# Patient Record
Sex: MALE
[De-identification: names, ages, dates, MRNs, and addresses within clinical notes are randomized per-mention and may not be internally consistent; named-entity substitution may affect disease eponyms.]

---

## 2021-01-01 ENCOUNTER — EXTERNAL RECORD (OUTPATIENT)
Dept: HEALTH INFORMATION MANAGEMENT | Facility: OTHER | Age: 61
End: 2021-01-01

## 2021-05-01 PROCEDURE — 99221 1ST HOSP IP/OBS SF/LOW 40: CPT | Performed by: SURGERY

## 2021-05-02 PROCEDURE — 99231 SBSQ HOSP IP/OBS SF/LOW 25: CPT | Performed by: SURGERY

## 2021-05-04 PROCEDURE — 99232 SBSQ HOSP IP/OBS MODERATE 35: CPT | Performed by: SURGERY

## 2021-05-10 PROCEDURE — 99221 1ST HOSP IP/OBS SF/LOW 40: CPT | Performed by: SURGERY

## 2021-05-11 PROCEDURE — 99233 SBSQ HOSP IP/OBS HIGH 50: CPT | Performed by: SURGERY

## 2021-05-12 PROCEDURE — 99233 SBSQ HOSP IP/OBS HIGH 50: CPT | Performed by: SURGERY

## 2021-05-13 PROCEDURE — 44160 REMOVAL OF COLON: CPT | Performed by: SURGERY

## 2021-05-13 PROCEDURE — 10061 I&D ABSCESS COMP/MULTIPLE: CPT | Performed by: SURGERY

## 2021-05-14 PROCEDURE — 99024 POSTOP FOLLOW-UP VISIT: CPT | Performed by: SURGERY

## 2021-05-15 PROCEDURE — 99024 POSTOP FOLLOW-UP VISIT: CPT | Performed by: SURGERY

## 2021-05-18 PROCEDURE — 44143 PARTIAL REMOVAL OF COLON: CPT | Performed by: SURGERY

## 2021-05-19 PROCEDURE — 99024 POSTOP FOLLOW-UP VISIT: CPT | Performed by: SURGERY

## 2021-05-21 PROCEDURE — 99024 POSTOP FOLLOW-UP VISIT: CPT | Performed by: SURGERY

## 2021-05-22 PROCEDURE — 99024 POSTOP FOLLOW-UP VISIT: CPT | Performed by: SURGERY

## 2021-06-11 ENCOUNTER — OFFICE VISIT (OUTPATIENT)
Dept: UROLOGY | Age: 61
End: 2021-06-11

## 2021-06-11 DIAGNOSIS — K63.1 PERFORATED SIGMOID COLON (CMD): ICD-10-CM

## 2021-06-11 DIAGNOSIS — Z90.49 S/P SMALL BOWEL RESECTION: ICD-10-CM

## 2021-06-11 DIAGNOSIS — K50.813 CROHN'S DISEASE OF BOTH SMALL AND LARGE INTESTINE WITH FISTULA (CMD): ICD-10-CM

## 2021-06-11 DIAGNOSIS — Z93.3 COLOSTOMY IN PLACE (CMD): Primary | ICD-10-CM

## 2021-06-11 DIAGNOSIS — Z90.49 S/P PARTIAL COLECTOMY: ICD-10-CM

## 2021-06-11 PROCEDURE — 99024 POSTOP FOLLOW-UP VISIT: CPT | Performed by: SURGERY

## 2021-06-21 PROBLEM — Z90.49 S/P PARTIAL COLECTOMY: Status: ACTIVE | Noted: 2021-06-21

## 2021-06-21 PROBLEM — Z93.3 COLOSTOMY IN PLACE (CMD): Status: ACTIVE | Noted: 2021-06-21

## 2021-06-21 PROBLEM — K50.813 CROHN'S DISEASE OF BOTH SMALL AND LARGE INTESTINE WITH FISTULA (CMD): Status: ACTIVE | Noted: 2021-06-21

## 2021-06-21 PROBLEM — Z90.49 S/P SMALL BOWEL RESECTION: Status: ACTIVE | Noted: 2021-06-21

## 2021-06-21 PROBLEM — K63.1 PERFORATED SIGMOID COLON (CMD): Status: ACTIVE | Noted: 2021-06-21

## 2021-06-21 ASSESSMENT — ENCOUNTER SYMPTOMS
HEMATOLOGIC/LYMPHATIC NEGATIVE: 1
ENDOCRINE NEGATIVE: 1
RESPIRATORY NEGATIVE: 1
CONSTITUTIONAL NEGATIVE: 1
PSYCHIATRIC NEGATIVE: 1
NEUROLOGICAL NEGATIVE: 1
EYES NEGATIVE: 1
ALLERGIC/IMMUNOLOGIC NEGATIVE: 1
GASTROINTESTINAL NEGATIVE: 1

## 2021-06-22 ENCOUNTER — CLINICAL ABSTRACT (OUTPATIENT)
Dept: HEALTH INFORMATION MANAGEMENT | Age: 61
End: 2021-06-22

## 2021-07-09 ENCOUNTER — OFFICE VISIT (OUTPATIENT)
Dept: SURGERY | Age: 61
End: 2021-07-09

## 2021-07-09 DIAGNOSIS — K63.1 PERFORATED SIGMOID COLON (CMD): ICD-10-CM

## 2021-07-09 DIAGNOSIS — Z90.49 S/P PARTIAL COLECTOMY: ICD-10-CM

## 2021-07-09 DIAGNOSIS — Z93.3 COLOSTOMY IN PLACE (CMD): Primary | ICD-10-CM

## 2021-07-09 DIAGNOSIS — Z90.49 S/P SMALL BOWEL RESECTION: ICD-10-CM

## 2021-07-09 DIAGNOSIS — K50.813 CROHN'S DISEASE OF BOTH SMALL AND LARGE INTESTINE WITH FISTULA (CMD): ICD-10-CM

## 2021-07-09 PROCEDURE — 99024 POSTOP FOLLOW-UP VISIT: CPT | Performed by: SURGERY

## 2021-07-09 RX ORDER — LOSARTAN POTASSIUM 25 MG/1
TABLET ORAL
COMMUNITY
Start: 2016-05-11

## 2021-07-09 RX ORDER — APIXABAN 5 MG/1
TABLET, FILM COATED ORAL
COMMUNITY
Start: 2021-04-08

## 2021-07-09 RX ORDER — NIACIN 500 MG
TABLET ORAL
COMMUNITY
Start: 2017-04-11

## 2021-07-26 ASSESSMENT — ENCOUNTER SYMPTOMS
CONSTITUTIONAL NEGATIVE: 1
RESPIRATORY NEGATIVE: 1
EYES NEGATIVE: 1
PSYCHIATRIC NEGATIVE: 1
NEUROLOGICAL NEGATIVE: 1
GASTROINTESTINAL NEGATIVE: 1
ENDOCRINE NEGATIVE: 1
HEMATOLOGIC/LYMPHATIC NEGATIVE: 1
ALLERGIC/IMMUNOLOGIC NEGATIVE: 1

## 2021-08-10 ENCOUNTER — OFFICE VISIT (OUTPATIENT)
Dept: SURGERY | Age: 61
End: 2021-08-10

## 2021-08-10 DIAGNOSIS — Z93.3 COLOSTOMY IN PLACE (CMD): Primary | ICD-10-CM

## 2021-08-10 DIAGNOSIS — Z90.49 S/P PARTIAL COLECTOMY: ICD-10-CM

## 2021-08-10 DIAGNOSIS — K63.1 PERFORATED SIGMOID COLON (CMD): ICD-10-CM

## 2021-08-10 DIAGNOSIS — K50.813 CROHN'S DISEASE OF BOTH SMALL AND LARGE INTESTINE WITH FISTULA (CMD): ICD-10-CM

## 2021-08-10 DIAGNOSIS — Z90.49 S/P SMALL BOWEL RESECTION: ICD-10-CM

## 2021-08-10 PROCEDURE — 99024 POSTOP FOLLOW-UP VISIT: CPT | Performed by: SURGERY

## 2021-08-16 ASSESSMENT — ENCOUNTER SYMPTOMS
EYES NEGATIVE: 1
CONSTITUTIONAL NEGATIVE: 1
NEUROLOGICAL NEGATIVE: 1
RESPIRATORY NEGATIVE: 1
HEMATOLOGIC/LYMPHATIC NEGATIVE: 1
ENDOCRINE NEGATIVE: 1
ALLERGIC/IMMUNOLOGIC NEGATIVE: 1
PSYCHIATRIC NEGATIVE: 1
GASTROINTESTINAL NEGATIVE: 1

## 2021-09-08 ENCOUNTER — TELEPHONE (OUTPATIENT)
Dept: SURGERY | Age: 61
End: 2021-09-08

## 2025-01-30 ENCOUNTER — HBO CONSULT (OUTPATIENT)
Dept: WOUND CARE | Facility: HOSPITAL | Age: 65
End: 2025-01-30
Attending: FAMILY MEDICINE
Payer: MEDICARE

## 2025-01-30 VITALS
RESPIRATION RATE: 16 BRPM | SYSTOLIC BLOOD PRESSURE: 134 MMHG | DIASTOLIC BLOOD PRESSURE: 78 MMHG | HEART RATE: 72 BPM | TEMPERATURE: 98 F

## 2025-01-30 VITALS
DIASTOLIC BLOOD PRESSURE: 87 MMHG | TEMPERATURE: 97 F | SYSTOLIC BLOOD PRESSURE: 134 MMHG | RESPIRATION RATE: 16 BRPM | HEART RATE: 106 BPM

## 2025-01-30 DIAGNOSIS — R30.0 DYSURIA: ICD-10-CM

## 2025-01-30 DIAGNOSIS — N30.40 RADIATION CYSTITIS: Primary | ICD-10-CM

## 2025-01-30 DIAGNOSIS — Z92.3 HISTORY OF THERAPEUTIC RADIATION: ICD-10-CM

## 2025-01-30 DIAGNOSIS — Z85.46 HISTORY OF PROSTATE CANCER: ICD-10-CM

## 2025-01-30 DIAGNOSIS — R31.0 GROSS HEMATURIA: ICD-10-CM

## 2025-01-30 PROCEDURE — 99205 OFFICE O/P NEW HI 60 MIN: CPT | Performed by: FAMILY MEDICINE

## 2025-01-30 RX ORDER — AMLODIPINE BESYLATE 5 MG/1
5 TABLET ORAL DAILY
COMMUNITY

## 2025-01-30 RX ORDER — TAMSULOSIN HYDROCHLORIDE 0.4 MG/1
0.4 CAPSULE ORAL DAILY
COMMUNITY

## 2025-01-30 NOTE — PROGRESS NOTES
.Weekly Wound Education Note    Teaching Provided To: Patient  Training Topics: HBO;Test/procedures  Training Method: Explain/Verbal;Written  Training Response: Patient responds and understands            Patient is here for an HBOT consult for radiation cystitis.  History of prostate surgery 2020, brachytherapy 2022.  Tech consult after this visit.  Patient would like to begin treatment after vacation Feb. 17th.

## 2025-01-30 NOTE — PROGRESS NOTES
Hyperbaric Therapy Evaluation      Robert Andrew is a 64 year old male.   REFERRING PHYSICIAN: Dr. Cuevas ref. provider found    HPI:  Patient here for HBOT evaluation.  Reason for HBOT episodes of pain with urination which is 10 out of 10.  This started over the past 2 months.  He had noticed an episode of bleeding as well.  This happened twice.  Patient has a history of having prostate cancer with seed radiation seed implants done 2 years ago.  He otherwise feels well and does have a past medical history significant for multiple blood clots in the lung after having DVT in the left calf and around 2020 or 2021.  He has no problems with that now and denies any chest pain shortness of breath.  He is on Eliquis since that time.  He does have a colostomy right lower abdomen.    Does the patient have a prior history of HBO treatments?  NO    Does the patient have a history of ear disease including prior tympanic membrane injury of pressure equalization tubes?  NO    Does the patient have a history of problems equalizing his ears during air travel?  NO    Does the patient have a history of claustrophobia?  YES    Does the patient have a history of uncorrected cataracts? NO    Does the patient have a history of pneumothorax?  NO    Does the patient have a history of seizures?  NO    Does the patient have a history of cardiomyopathy?  NO    Is the patient on Home Oxygen?  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 YES, 2  YEARS AGO, PROSTATE SEED IMPLANTS    Current Outpatient Medications   Medication Sig Dispense Refill    apixaban 5 MG Oral Tab Take 1 tablet (5 mg total) by mouth 2 (two) times daily.      amLODIPine 5 MG Oral Tab Take 1 tablet (5 mg total) by mouth daily.      tamsulosin 0.4 MG Oral Cap Take 1 capsule (0.4 mg total) by mouth daily.       Past Medical History:    Blood disorder    Cancer (HCC)    Crohn disease (HCC)    Deep vein thrombosis (HCC)     High blood pressure    Pulmonary embolism (HCC)     Past Surgical History:   Procedure Laterality Date    Bowel resection      Chemotherapy      Colonoscopy through stoma; with transendoscopic stent placement (includes predilation)      Us tenotomy achilles tendon (cpt=27605/31993)       Family History   Problem Relation Age of Onset    Diabetes Father     Cancer Mother      Social History     Socioeconomic History    Marital status:    Tobacco Use    Smoking status: Former     Types: Cigars    Smokeless tobacco: Never   Substance and Sexual Activity    Alcohol use: Never    Drug use: Never       REVIEW OF SYSTEMS:  GENERAL: feels well otherwise  SKIN: denies any unusual skin lesions  EYES: denies blurred vision or double vision  HEENT: denies nasal congestion  RESPIRATORY:denies wheezing  CARDIOVASCULAR: denies chest pain  GI: denies abdominal pain, denies heartburn  : denies hematuria  MUSCULOSKELETAL: denies chronic back pain  NEURO: denies chronic headaches  PSYCHE: denies uncontrolled depression or anxiety  HEMATOLOGIC: denies hx of VTE  ENDOCRINE: denies thyroid history      EXAM:  /78   Pulse 72   Temp 97.9 °F (36.6 °C)   Resp 16   GENERAL: well developed, well nourished, in no apparent distress  HEENT: atraumatic, normocephalic, throat  clear  EYES: conjunctiva are clear  NECK: supple, no adenopathy, no bruits  CHEST: no chest tenderness  LUNGS:clear to auscultation  CARDIO: S1S2  without murmur  GI: no tenderness  MUSCULOSKELETAL: back is not tender  EXTREMITIES: no cyanosis, clubbing or edema  NEURO: Oriented times three, motor and sensory are grossly intact    ASSESSMENT AND PLAN:    Robert was seen today for hyperbaric consult.    Diagnoses and all orders for this visit:    Radiation cystitis    History of therapeutic radiation    History of prostate cancer    Dysuria    Gross hematuria        Pretreatment evaluation for HBOT to treat RADIATION CYSTITIS WITH DYSURIA AND HEMATURIA.  At  this time no absolute contraindication to HBOT is present.  The nature, alternatives and benefits of HBOT have been explained to the patient.  The risk of HBOT including but not limited to barotrauma, oxygen toxicity, visual acuity changes, worsening of established cataracts, tingling of fingers, seizures and rare possibility of fire have been explained to the patient who expressed understanding.  Plan is to proceed with HBOT.    I recommend 30 treatments to start    2.5 JULEE with 10 minute air break    2.0 psi/minute    2.0 psi/minute  Spent a total of 60 minutes obtaining history evaluating patient, reviewing medical chart, discussing treatment options and completing documentation.

## 2025-01-30 NOTE — PROGRESS NOTES
Pt. Received hyperbaric safety and technical consultation along with hyperbaric patient education.   Pt. Indicated understanding.

## 2025-02-10 NOTE — PROGRESS NOTES
Patient requesting antianxiety medicine to do hyperbaric oxygen treatments.    Will prescribe Xanax 0.5 mg take 1 or 2 tablets 30 minutes before hyperbaric oxygen treatment    Risk and benefit discussed including habit-forming nature.

## 2025-02-17 ENCOUNTER — HBO THERAPY VISIT (OUTPATIENT)
Dept: WOUND CARE | Facility: HOSPITAL | Age: 65
End: 2025-02-17
Attending: FAMILY MEDICINE
Payer: MEDICARE

## 2025-02-17 DIAGNOSIS — Z92.3 HISTORY OF THERAPEUTIC RADIATION: ICD-10-CM

## 2025-02-17 DIAGNOSIS — N30.40 RADIATION CYSTITIS: Primary | ICD-10-CM

## 2025-02-17 DIAGNOSIS — Z85.46 HISTORY OF PROSTATE CANCER: ICD-10-CM

## 2025-02-17 DIAGNOSIS — R31.0 GROSS HEMATURIA: ICD-10-CM

## 2025-02-17 LAB — GLUCOSE BLD-MCNC: 111 MG/DL (ref 70–99)

## 2025-02-17 PROCEDURE — 82962 GLUCOSE BLOOD TEST: CPT | Performed by: INTERNAL MEDICINE

## 2025-02-17 NOTE — PROGRESS NOTES
Hyperbaric Daily Physician Note       Patient is here for Hyperbaric Oxygen therapy.   No complaints today.   Tolerating HBO with no side effects.     Blood Sugar:  Within normal limits.    Vitals:      1/30/2025     1:40 PM 1/30/2025     3:06 PM   Vitals History   /78 134/87   Pulse 72 106   Resp 16 16   Temp 97.9 °F (36.6 °C) 97.3 °F (36.3 °C)        DIAGNOSIS:       ICD-10-CM    1. Radiation cystitis  N30.40       2. Gross hematuria  R31.0       3. History of prostate cancer  Z85.46       4. History of therapeutic radiation  Z92.3           Pre/Post Treatment Evaluation      Pre-Treatment Lung Evaluation: Clear to auscultation bilaterally    Left Ear Pre-Treatment Evaluation  Left Ear Clear: Yes  Left Ear Intact: Yes  Left Cerumen Removal: No  Pre Left teed stgstrstastdstest:st st1st grade    Right Ear Pre-Treatment Evaluation  Right Ear Clear: Yes  Right Ear Intact: Yes  Right Cerumen Removal: No  Pre Right teed stgstrstastdstest:st st1st grade      Post-Treatment Lung Evaluation: Clear to auscultation bilaterally    Left Ear Post-Treatment Evaluation  Left Ear Clear: Yes  Left Ear Intact: Yes  Left Cerumen Removal: No  Post Left teed stgstrstastdstest:st st1st grade    Right Ear Post-Treatment Evaluation  Right Ear Clear: Yes  Right Ear Intact: Yes  Right Cerumen Removal: No  Post Right teed stgstrstastdstest:st st1st grade      I supervised this Hyperbaric treatment and was immediately available throughout the course of the treatment.  There is a trained emergency support team and ICU available at this facility to assist with complications.         Kwabena Harvey MD  2/17/2025  4:48 PM

## 2025-02-18 ENCOUNTER — HBO THERAPY VISIT (OUTPATIENT)
Dept: WOUND CARE | Facility: HOSPITAL | Age: 65
End: 2025-02-18
Attending: FAMILY MEDICINE
Payer: MEDICARE

## 2025-02-18 VITALS
DIASTOLIC BLOOD PRESSURE: 96 MMHG | RESPIRATION RATE: 12 BRPM | HEART RATE: 71 BPM | TEMPERATURE: 98 F | SYSTOLIC BLOOD PRESSURE: 147 MMHG

## 2025-02-18 DIAGNOSIS — R30.0 DYSURIA: ICD-10-CM

## 2025-02-18 DIAGNOSIS — N30.40 RADIATION CYSTITIS: Primary | ICD-10-CM

## 2025-02-18 DIAGNOSIS — Z85.46 HISTORY OF PROSTATE CANCER: ICD-10-CM

## 2025-02-18 DIAGNOSIS — R31.0 GROSS HEMATURIA: ICD-10-CM

## 2025-02-18 DIAGNOSIS — Z92.3 HISTORY OF THERAPEUTIC RADIATION: ICD-10-CM

## 2025-02-18 PROCEDURE — 99183 HYPERBARIC OXYGEN THERAPY: CPT | Performed by: FAMILY MEDICINE

## 2025-02-18 NOTE — PROGRESS NOTES
HBO Treatment Details      Patient Name:    Robert Andrew  MRN:  [id number]        YOB: 1960  Today's Date:  2025  Physician/Provider: Kwabena Harvey MD  Facility: [facility]  Diagnosis:   1. Radiation cystitis    2. Gross hematuria    3. History of prostate cancer    4. History of therapeutic radiation            Treatment Course Number: 1  Total Treatments Ordered:  30  Ordering Physician:   Indication: Soft tissue radionecrosis  Risk Factors: Seizure;Barotrauma  HBO Treatment Start Date:   HBO Treatment Number:  1  Treatment Length:120 Minutes  Treatment Depth: 2.5 JULEE  HBO Treatment End Date:   HBO Discharge Outcome: Treatment Complete        HBO Treatment Details:  In-Patient Visit: no  Chamber Type:  Hard sided monoplace  Chamber #: 2  HBO Dive Lo  Treatment Protocol:  2.5 JULEE with 10 min air break      Treatment Details:  Pressurized Rate: 2.0 psi/min  Dive Rate Down:  2.0 psi/min  Dive Rate Up:  2.0 psi/min  Started Compression: 1507  Reached Compression: 1517  Patient on Air: 1605  Patient Taken off Air:1615  Total Compression Time: 10 (Minutes)  Total Holding Time: 103 (Minutes)  Started Decompression: 1700  Reached Surface: 1517  Total Decompression Time: 10 (Minutes)  Total Airbreaks: 10 (Minutes)  Total Time of Treatment: 113 (Minutes)                         Vital Signs:  HBO Glucose: Pre 110 post 104 - (Reference Range: 100-350 mg/dl)        Vitals:    25 1701 25 1716   BP: 152/83 (!) 147/96   Pulse: 73 71   Resp: 12 12   Temp: 98.2 °F (36.8 °C) 97.7 °F (36.5 °C)       Treatment Response:  Symptoms Noted During Treatment: None   Treatment Aborted:  No      Treatment Notes:

## 2025-02-19 ENCOUNTER — HBO THERAPY VISIT (OUTPATIENT)
Dept: WOUND CARE | Facility: HOSPITAL | Age: 65
End: 2025-02-19
Attending: FAMILY MEDICINE
Payer: MEDICARE

## 2025-02-19 VITALS
DIASTOLIC BLOOD PRESSURE: 87 MMHG | HEART RATE: 68 BPM | SYSTOLIC BLOOD PRESSURE: 157 MMHG | RESPIRATION RATE: 14 BRPM | TEMPERATURE: 98 F

## 2025-02-19 DIAGNOSIS — Z92.3 HISTORY OF THERAPEUTIC RADIATION: ICD-10-CM

## 2025-02-19 DIAGNOSIS — Z85.46 HISTORY OF PROSTATE CANCER: ICD-10-CM

## 2025-02-19 DIAGNOSIS — R30.0 DYSURIA: ICD-10-CM

## 2025-02-19 DIAGNOSIS — N30.40 RADIATION CYSTITIS: Primary | ICD-10-CM

## 2025-02-19 PROCEDURE — 99183 HYPERBARIC OXYGEN THERAPY: CPT | Performed by: NURSE PRACTITIONER

## 2025-02-19 NOTE — PROGRESS NOTES
HBO Treatment Details      Patient Name:    Robert Andrew  MRN:  [id number]        YOB: 1960  Today's Date:  2/19/2025  Physician/Provider: Naresh Tuttle MD  Facility: [facility]  Diagnosis:   1. Radiation cystitis    2. Gross hematuria    3. History of prostate cancer    4. History of therapeutic radiation    5. Dysuria            Treatment Course Number: 2  Total Treatments Ordered:  30  Ordering Physician:   Indication: Soft tissue radionecrosis  Risk Factors: Seizure;Barotrauma  HBO Treatment Start Date:   HBO Treatment Number:  2  Treatment Length:120 Minutes  Treatment Depth: 2.5 JULEE  HBO Treatment End Date:   HBO Discharge Outcome: Treatment Complete        HBO Treatment Details:  In-Patient Visit: no  Chamber Type:  Hard sided monoplace  Chamber #: 2  HBO Dive Log:    Treatment Protocol:  2.5 JULEE with 10 min air break      Treatment Details:  Pressurized Rate: 2.0 psi/min  Dive Rate Down:  20 psi/min  Dive Rate Up:  2.0 psi/min  Started Compression: 1511  Reached Compression: 1521  Patient on Air: 1606  Patient Taken off Air:1616  Total Compression Time: 10 (Minutes)  Total Holding Time: 100 (Minutes)  Started Decompression: 1701  Reached Surface: 1521  Total Decompression Time: 10 (Minutes)  Total Airbreaks: 10 (Minutes)  Total Time of Treatment: 110 (Minutes)                         Vital Signs:  HBO Glucose:  - (Reference Range: 100-350 mg/dl)        Vitals:    02/18/25 1505 02/18/25 1715   BP: 155/84 157/87   Pulse: 68 68   Resp: 12 14   Temp: 97.7 °F (36.5 °C) 97.8 °F (36.6 °C)       Treatment Response:  Symptoms Noted During Treatment: None   Treatment Aborted:  No      Treatment Notes:  No

## 2025-02-19 NOTE — PROGRESS NOTES
Hyperbaric Daily Physician Note       Patient is here for Hyperbaric Oxygen therapy.     VITALS:      2/18/2025     3:05 PM 2/18/2025     5:15 PM   Vitals History   /84 157/87   Pulse 68 68   Resp 12 14   Temp 97.7 °F (36.5 °C) 97.8 °F (36.6 °C)         Vitals:  See HBO tech note/flowsheets for vitals pre/post    DIAGNOSIS:   1. Radiation cystitis    2. Dysuria    3. History of prostate cancer    4. History of therapeutic radiation        Pre/Post Treatment Evaluation      Pre-Treatment Lung Evaluation: Clear to auscultation bilaterally    Left Ear Pre-Treatment Evaluation  Left Ear Clear: Yes  Left Ear Intact: Yes  Left Cerumen Removal: No  Pre Left teed stgstrstastdstest:st st1st grade    Right Ear Pre-Treatment Evaluation  Right Ear Clear: Yes  Right Ear Intact: Yes  Right Cerumen Removal: No  Pre Right teed stgstrstastdstest:st st1st grade      Post-Treatment Lung Evaluation: Clear to auscultation bilaterally    Left Ear Post-Treatment Evaluation  Left Ear Clear: Yes  Left Ear Intact: Yes  Left Cerumen Removal: No  Post Left teed stgstrstastdstest:st st1st grade    Right Ear Post-Treatment Evaluation  Right Ear Clear: Yes  Right Ear Intact: Yes  Right Cerumen Removal: No  Post Right teed stgstrstastdstest:st st1st grade      I supervised this Hyperbaric treatment and was immediately available throughout the course of the treatment with a hospital approved hyperbaric physician available by phone.  In addition, there is a trained emergency support team and ICU available at this facility to assist with complications.         Madiha Lopez, BRENNON  2/19/2025  5:18 PM

## 2025-02-20 ENCOUNTER — HBO THERAPY VISIT (OUTPATIENT)
Dept: WOUND CARE | Facility: HOSPITAL | Age: 65
End: 2025-02-20
Attending: FAMILY MEDICINE
Payer: MEDICARE

## 2025-02-20 VITALS
RESPIRATION RATE: 12 BRPM | SYSTOLIC BLOOD PRESSURE: 144 MMHG | HEART RATE: 68 BPM | DIASTOLIC BLOOD PRESSURE: 84 MMHG | TEMPERATURE: 98 F

## 2025-02-20 VITALS
SYSTOLIC BLOOD PRESSURE: 138 MMHG | TEMPERATURE: 97 F | DIASTOLIC BLOOD PRESSURE: 86 MMHG | HEART RATE: 72 BPM | RESPIRATION RATE: 12 BRPM

## 2025-02-20 PROCEDURE — 99183 HYPERBARIC OXYGEN THERAPY: CPT | Performed by: FAMILY MEDICINE

## 2025-02-20 NOTE — PROGRESS NOTES
HBO Treatment Details      Patient Name:    Robert Andrew  MRN:  [id number]        YOB: 1960  Today's Date:  2025  Physician/Provider: BRENNON Puckett  Facility: [facility]  Diagnosis:   1. Radiation cystitis    2. Dysuria    3. History of prostate cancer    4. History of therapeutic radiation            Treatment Course Number: 3  Total Treatments Ordered:  30  Ordering Physician:   Indication:    Risk Factors:    HBO Treatment Start Date:   HBO Treatment Number:  3  Treatment Length:   Treatment Depth:    HBO Treatment End Date:   HBO Discharge Outcome: Treatment Complete        HBO Treatment Details:  In-Patient Visit: no  Chamber Type:  Hard sided monoplace  Chamber #:    HBO Dive Lo  Treatment Protocol:  2.5 JULEE with 10 min air break      Treatment Details:  Pressurized Rate:    Dive Rate Down:  2.0 psi/min  Dive Rate Up:  2.0 psi/min  Started Compression:    Reached Compression:    Patient on Air:    Patient Taken off Air:   Total Compression Time:   (Minutes)  Total Holding Time:   (Minutes)  Started Decompression:    Reached Surface:    Total Decompression Time:   (Minutes)  Total Airbreaks:   (Minutes)  Total Time of Treatment:   (Minutes)                         Vital Signs:  HBO Glucose:  - (Reference Range: 100-350 mg/dl)        Vitals:    25 1453 25 1703   BP: 124/80 138/86   Pulse: 77 72   Resp: 12 12   Temp: 97.2 °F (36.2 °C) 97.4 °F (36.3 °C)       Treatment Response:  Symptoms Noted During Treatment:     Treatment Aborted:  No      Treatment Notes:

## 2025-02-20 NOTE — PROGRESS NOTES
HBO Treatment Details      Patient Name:    Robert Andrew  MRN:  [id number]        YOB: 1960  Today's Date:  2025  Physician/Provider: Naresh Tuttle MD  Facility: [facility]  Diagnosis: No diagnosis found.        Treatment Course Number: 4  Total Treatments Ordered:  30  Ordering Physician:   Indication: Soft tissue radionecrosis  Risk Factors: Seizure  HBO Treatment Start Date:   HBO Treatment Number:  4  Treatment Length:120 Minutes  Treatment Depth: 2.5 JULEE  HBO Treatment End Date:   HBO Discharge Outcome: Treatment Complete        HBO Treatment Details:  In-Patient Visit: no  Chamber Type:  Hard sided monoplace  Chamber #: 2  HBO Dive Lo  Treatment Protocol:  2.5 JULEE with 10 min air break      Treatment Details:  Pressurized Rate: 2.0 psi/min  Dive Rate Down:  2.0 psi/min  Dive Rate Up:  2.0 psi/min  Started Compression: 1443  Reached Compression: 1453  Patient on Air: 1538  Patient Taken off Air:1548  Total Compression Time: 10 (Minutes)  Total Holding Time: 100 (Minutes)  Started Decompression: 1633  Reached Surface: 1453  Total Decompression Time: 10 (Minutes)  Total Airbreaks: 10 (Minutes)  Total Time of Treatment: 110 (Minutes)                         Vital Signs:  HBO Glucose:  - (Reference Range: 100-350 mg/dl)        Vitals:    25 1437 25 1712   BP: 132/82 144/84   Pulse: 68 68   Resp: 12 12   Temp: 97.6 °F (36.4 °C) 97.5 °F (36.4 °C)       Treatment Response:  Symptoms Noted During Treatment: None   Treatment Aborted:  No      Treatment Notes:

## 2025-02-21 ENCOUNTER — HBO THERAPY VISIT (OUTPATIENT)
Dept: WOUND CARE | Facility: HOSPITAL | Age: 65
End: 2025-02-21
Attending: FAMILY MEDICINE
Payer: MEDICARE

## 2025-02-21 VITALS
SYSTOLIC BLOOD PRESSURE: 116 MMHG | TEMPERATURE: 97 F | RESPIRATION RATE: 12 BRPM | HEART RATE: 64 BPM | DIASTOLIC BLOOD PRESSURE: 78 MMHG

## 2025-02-21 DIAGNOSIS — N30.40 RADIATION CYSTITIS: Primary | ICD-10-CM

## 2025-02-21 DIAGNOSIS — R30.0 DYSURIA: ICD-10-CM

## 2025-02-21 DIAGNOSIS — Z85.46 HISTORY OF PROSTATE CANCER: ICD-10-CM

## 2025-02-21 PROCEDURE — 99183 HYPERBARIC OXYGEN THERAPY: CPT | Performed by: NURSE PRACTITIONER

## 2025-02-21 NOTE — PROGRESS NOTES
Hyperbaric Daily Physician Note       Patient is here for Hyperbaric Oxygen therapy.     VITALS:      2/20/2025     5:12 PM 2/21/2025     2:45 PM   Vitals History   /84 142/84   Pulse 68 80   Resp 12 14   Temp 97.5 °F (36.4 °C) 97.8 °F (36.6 °C)         Vitals:  See HBO tech note/flowsheets for vitals pre/post    DIAGNOSIS:   1. Radiation cystitis    2. Dysuria    3. History of prostate cancer        Pre/Post Treatment Evaluation      Pre-Treatment Lung Evaluation: Clear to auscultation bilaterally    Left Ear Pre-Treatment Evaluation  Left Ear Clear: Yes  Left Ear Intact: Yes  Left Cerumen Removal: No  Pre Left teed stgstrstastdstest:st st1st grade    Right Ear Pre-Treatment Evaluation  Right Ear Clear: Yes  Right Ear Intact: Yes  Right Cerumen Removal: No  Pre Right teed stgstrstastdstest:st st1st grade      Post-Treatment Lung Evaluation: Clear to auscultation bilaterally    Left Ear Post-Treatment Evaluation  Left Ear Clear: Yes  Left Ear Intact: Yes  Left Cerumen Removal: No  Post Left teed stgstrstastdstest:st st1st grade    Right Ear Post-Treatment Evaluation  Right Ear Clear: Yes  Right Ear Intact: Yes  Right Cerumen Removal: No  Post Right teed stgstrstastdstest:st st1st grade      I supervised this Hyperbaric treatment and was immediately available throughout the course of the treatment with a hospital approved hyperbaric physician available by phone.  In addition, there is a trained emergency support team and ICU available at this facility to assist with complications.         Madiha Lopez, BRENNON  2/21/2025  5:03 PM

## 2025-02-21 NOTE — PROGRESS NOTES
HBO Treatment Details      Patient Name:    Robert Andrew  MRN:  [id number]        YOB: 1960  Today's Date:  2025  Physician/Provider: BRENNON Puckett  Facility: [facility]  Diagnosis:   1. Radiation cystitis    2. Dysuria    3. History of prostate cancer            Treatment Course Number: 5  Total Treatments Ordered:  30    Indication: STRN  Risk Factors:  Barotrauma, Seizure    HBO Treatment Number:  5    HBO Discharge Outcome: No Change        HBO Treatment Details:  In-Patient Visit: no  Chamber Type:  hard sided monoplace  Chamber #: 2  HBO Dive Lo  Treatment Protocol:  2.5 JULEE with 10 minute air break      Treatment Details:  Pressurized Rate:    Dive Rate Down:  2.0 psi  Dive Rate Up:  2.0 psi  Started Compression:  1458  Reached Compression:  1508  Patient on Air:  1553  Patient Taken off Air: 1603    Started Decompression:  1648  Reached Surface:  1658  Total Time of Treatment:  120 (Minutes)                         Vital Signs:       Vitals:    25 1445 25 1720   BP: 142/84 116/78   Pulse: 80 64   Resp: 14 12   Temp: 97.8 °F (36.6 °C) 97.2 °F (36.2 °C)       Treatment Response:  Symptoms Noted During Treatment:     Treatment Aborted:  no

## 2025-02-24 ENCOUNTER — HBO THERAPY VISIT (OUTPATIENT)
Dept: WOUND CARE | Facility: HOSPITAL | Age: 65
End: 2025-02-24
Attending: FAMILY MEDICINE
Payer: MEDICARE

## 2025-02-24 DIAGNOSIS — N30.40 RADIATION CYSTITIS: Primary | ICD-10-CM

## 2025-02-24 DIAGNOSIS — R30.0 DYSURIA: ICD-10-CM

## 2025-02-24 DIAGNOSIS — Z92.3 HISTORY OF THERAPEUTIC RADIATION: ICD-10-CM

## 2025-02-24 DIAGNOSIS — Z85.46 HISTORY OF PROSTATE CANCER: ICD-10-CM

## 2025-02-24 NOTE — PROGRESS NOTES
Hyperbaric Daily Physician Note       Patient is here for Hyperbaric Oxygen therapy.   No complaints today.   Tolerating HBO with no side effects.     Blood Sugar:  Within normal limits.    Vitals:      2/21/2025     2:45 PM 2/21/2025     5:20 PM   Vitals History   /84 116/78   Pulse 80 64   Resp 14 12   Temp 97.8 °F (36.6 °C) 97.2 °F (36.2 °C)        DIAGNOSIS:       ICD-10-CM    1. Radiation cystitis  N30.40       2. Dysuria  R30.0       3. History of prostate cancer  Z85.46       4. History of therapeutic radiation  Z92.3           Pre/Post Treatment Evaluation      Pre-Treatment Lung Evaluation: Clear to auscultation bilaterally    Left Ear Pre-Treatment Evaluation  Left Ear Clear: Yes  Left Ear Intact: Yes  Left Cerumen Removal: No  Pre Left teed stgstrstastdstest:st st1st grade    Right Ear Pre-Treatment Evaluation  Right Ear Clear: Yes  Right Ear Intact: Yes  Right Cerumen Removal: No  Pre Right teed stgstrstastdstest:st st1st grade      Post-Treatment Lung Evaluation: Clear to auscultation bilaterally    Left Ear Post-Treatment Evaluation  Left Ear Clear: Yes  Left Ear Intact: Yes  Left Cerumen Removal: No  Post Left teed stgstrstastdstest:st st1st grade    Right Ear Post-Treatment Evaluation  Right Ear Clear: Yes  Right Ear Intact: Yes  Right Cerumen Removal: No  Post Right teed stgstrstastdstest:st st1st grade      I supervised this Hyperbaric treatment and was immediately available throughout the course of the treatment.  There is a trained emergency support team and ICU available at this facility to assist with complications.         Kwabena Harvey MD  2/24/2025  5:06 PM

## 2025-02-25 ENCOUNTER — HBO THERAPY VISIT (OUTPATIENT)
Dept: WOUND CARE | Facility: HOSPITAL | Age: 65
End: 2025-02-25
Attending: FAMILY MEDICINE
Payer: MEDICARE

## 2025-02-25 VITALS
DIASTOLIC BLOOD PRESSURE: 80 MMHG | RESPIRATION RATE: 16 BRPM | HEART RATE: 62 BPM | TEMPERATURE: 97 F | SYSTOLIC BLOOD PRESSURE: 128 MMHG

## 2025-02-25 DIAGNOSIS — N30.40 RADIATION CYSTITIS: Primary | ICD-10-CM

## 2025-02-25 PROCEDURE — 99183 HYPERBARIC OXYGEN THERAPY: CPT | Performed by: FAMILY MEDICINE

## 2025-02-25 NOTE — PROGRESS NOTES
HBO Treatment Details      Patient Name:    Robert Andrew  MRN:  [id number]        YOB: 1960  Today's Date:  2025  Physician/Provider: Naresh Tuttle MD  Facility: [facility]  Diagnosis: No diagnosis found.        Treatment Course Number: 7  Total Treatments Ordered:  30  Ordering Physician:   Indication: Soft tissue radionecrosis  Risk Factors: Barotrauma;Seizure  HBO Treatment Start Date:   HBO Treatment Number:  7  Treatment Length:120 Minutes  Treatment Depth: 2.5 JULEE  HBO Treatment End Date:   HBO Discharge Outcome: Treatment Complete        HBO Treatment Details:  In-Patient Visit: no  Chamber Type:  Monoplace  Chamber #: 2  HBO Dive Lo  Treatment Protocol:  2.5 JULEE with 10 minute air break      Treatment Details:  Pressurized Rate: 2.0 psi/min  Dive Rate Down:  2.0 psi/min   Dive Rate Up:  2.0 psi/min  Started Compression: 1447  Reached Compression: 1458  Patient on Air: 1543  Patient Taken off Air:1553  Total Compression Time: 11 (Minutes)  Total Holding Time: 100 (Minutes)  Started Decompression: 1638  Reached Surface: 1458  Total Decompression Time: 10 (Minutes)  Total Airbreaks: 10 (Minutes)  Total Time of Treatment: 111 (Minutes)                         Vital Signs:  HBO Glucose: n/a - (Reference Range: 100-350 mg/dl)        Vitals:    25 1442 25 1653   BP: 122/78 128/80   Pulse: 68 62   Resp: 12 16   Temp: 97.9 °F (36.6 °C) 97.4 °F (36.3 °C)       Treatment Response:  Symptoms Noted During Treatment: None   Treatment Aborted:  no      Treatment Notes:  Patient is here for a wound care follow up.

## 2025-02-26 ENCOUNTER — HBO THERAPY VISIT (OUTPATIENT)
Dept: WOUND CARE | Facility: HOSPITAL | Age: 65
End: 2025-02-26
Attending: FAMILY MEDICINE
Payer: MEDICARE

## 2025-02-26 DIAGNOSIS — Z92.3 HISTORY OF THERAPEUTIC RADIATION: ICD-10-CM

## 2025-02-26 DIAGNOSIS — N30.40 RADIATION CYSTITIS: Primary | ICD-10-CM

## 2025-02-26 DIAGNOSIS — Z85.46 HISTORY OF PROSTATE CANCER: ICD-10-CM

## 2025-02-26 NOTE — PROGRESS NOTES
Hyperbaric Daily Physician Note       Patient is here for Hyperbaric Oxygen therapy.   No complaints today.   Tolerating HBO with no side effects.     Blood Sugar:  Within normal limits.    Vitals:      2/25/2025     2:42 PM 2/25/2025     4:53 PM   Vitals History   /78 128/80   Pulse 68 62   Resp 12 16   Temp 97.9 °F (36.6 °C) 97.4 °F (36.3 °C)        DIAGNOSIS:       ICD-10-CM    1. Radiation cystitis  N30.40       2. History of prostate cancer  Z85.46       3. History of therapeutic radiation  Z92.3           Pre/Post Treatment Evaluation      Pre-Treatment Lung Evaluation: Clear to auscultation bilaterally    Left Ear Pre-Treatment Evaluation  Left Ear Clear: Yes  Left Ear Intact: Yes  Left Cerumen Removal: No  Pre Left teed stgstrstastdstest:st st1st grade    Right Ear Pre-Treatment Evaluation  Right Ear Clear: Yes  Right Ear Intact: Yes  Right Cerumen Removal: No  Pre Right teed stgstrstastdstest:st st1st grade      Post-Treatment Lung Evaluation: Clear to auscultation bilaterally    Left Ear Post-Treatment Evaluation  Left Ear Clear: Yes  Left Ear Intact: Yes  Left Cerumen Removal: No  Post Left teed stgstrstastdstest:st st1st grade    Right Ear Post-Treatment Evaluation  Right Ear Clear: Yes  Right Ear Intact: Yes  Right Cerumen Removal: No  Post Right teed stgstrstastdstest:st st1st grade      I supervised this Hyperbaric treatment and was immediately available throughout the course of the treatment.  There is a trained emergency support team and ICU available at this facility to assist with complications.         Kwabena Harvey MD  2/26/2025  5:18 PM

## 2025-02-27 ENCOUNTER — HBO THERAPY VISIT (OUTPATIENT)
Dept: WOUND CARE | Facility: HOSPITAL | Age: 65
End: 2025-02-27
Attending: FAMILY MEDICINE
Payer: MEDICARE

## 2025-02-27 VITALS
TEMPERATURE: 98 F | HEART RATE: 80 BPM | RESPIRATION RATE: 14 BRPM | SYSTOLIC BLOOD PRESSURE: 136 MMHG | DIASTOLIC BLOOD PRESSURE: 88 MMHG

## 2025-02-27 VITALS
HEART RATE: 76 BPM | SYSTOLIC BLOOD PRESSURE: 122 MMHG | RESPIRATION RATE: 12 BRPM | TEMPERATURE: 98 F | DIASTOLIC BLOOD PRESSURE: 86 MMHG

## 2025-02-27 DIAGNOSIS — N30.40 RADIATION CYSTITIS: Primary | ICD-10-CM

## 2025-02-27 NOTE — PROGRESS NOTES
HBO Treatment Details      Patient Name:    Robert Anrdew  MRN:  [id number]        YOB: 1960  Today's Date:  2/27/2025  Physician/Provider: Naresh Tuttle MD  Facility: [facility]  Diagnosis:   1. Radiation cystitis            Treatment Course Number: 9  Total Treatments Ordered:  30  Ordering Physician:   Indication: Soft tissue radionecrosis  Risk Factors: Seizure;Barotrauma  HBO Treatment Start Date:   HBO Treatment Number:  9  Treatment Length:120 Minutes  Treatment Depth: 2.5 JULEE  HBO Treatment End Date:   HBO Discharge Outcome: Treatment Complete        HBO Treatment Details:  In-Patient Visit: no  Chamber Type:  Hard sided monoplace  Chamber #: 2  HBO Dive Log:    Treatment Protocol:  2.5 JULEE with 10 min air break      Treatment Details:  Pressurized Rate: 2.0 psi/min  Dive Rate Down:  2.0 psi/min  Dive Rate Up:  2.0 psi/min  Started Compression: 1458  Reached Compression: 1508  Patient on Air: 1553  Patient Taken off Air:1603  Total Compression Time: 10 (Minutes)  Total Holding Time: 100 (Minutes)  Started Decompression: 1648  Reached Surface: 1508  Total Decompression Time: 10 (Minutes)  Total Airbreaks: 10 (Minutes)  Total Time of Treatment: 110 (Minutes)                         Vital Signs:  HBO Glucose:  - (Reference Range: 100-350 mg/dl)        Vitals:    02/27/25 1452 02/27/25 1725   BP: 136/78 136/88   Pulse: 80 80   Resp: 12 14   Temp: 97.8 °F (36.6 °C) 98.4 °F (36.9 °C)       Treatment Response:  Symptoms Noted During Treatment: None   Treatment Aborted:  No      Treatment Notes:

## 2025-02-27 NOTE — PROGRESS NOTES
HBO Treatment Details      Patient Name:    Robert Andrew  MRN:  [id number]        YOB: 1960  Today's Date:  2/27/2025  Physician/Provider: Kwabena Harvey MD  Facility: [facility]  Diagnosis:   1. Radiation cystitis    2. History of prostate cancer    3. History of therapeutic radiation            Treatment Course Number: 8  Total Treatments Ordered:  30  Ordering Physician:   Indication: Soft tissue radionecrosis  Risk Factors: Seizure;Barotrauma  HBO Treatment Start Date:   HBO Treatment Number:  8  Treatment Length:120 Minutes  Treatment Depth: 2.5 JULEE  HBO Treatment End Date:   HBO Discharge Outcome: Treatment Complete        HBO Treatment Details:  In-Patient Visit: no  Chamber Type:  Hard sided monoplace  Chamber #: 2  HBO Dive Log:    Treatment Protocol:  2.5 JULEE with 10 min air break      Treatment Details:  Pressurized Rate: 2.0 psi/min  Dive Rate Down:  2.0 psi/min  Dive Rate Up:  2.0 psi/min  Started Compression: 1453  Reached Compression: 1503  Patient on Air: 1548  Patient Taken off Air:1558  Total Compression Time: 10 (Minutes)  Total Holding Time: 100 (Minutes)  Started Decompression: 1643  Reached Surface: 1503  Total Decompression Time: 10 (Minutes)  Total Airbreaks: 10 (Minutes)  Total Time of Treatment: 110 (Minutes)                         Vital Signs:  HBO Glucose:  - (Reference Range: 100-350 mg/dl)        Vitals:    02/26/25 1443   BP: 122/86   Pulse: 76   Resp: 12   Temp: 97.6 °F (36.4 °C)       Treatment Response:  Symptoms Noted During Treatment: None   Treatment Aborted:  No      Treatment Notes:

## 2025-02-28 ENCOUNTER — APPOINTMENT (OUTPATIENT)
Dept: WOUND CARE | Facility: HOSPITAL | Age: 65
End: 2025-02-28
Attending: FAMILY MEDICINE
Payer: MEDICARE

## 2025-03-03 ENCOUNTER — APPOINTMENT (OUTPATIENT)
Dept: WOUND CARE | Facility: HOSPITAL | Age: 65
End: 2025-03-03
Attending: FAMILY MEDICINE
Payer: MEDICARE

## 2025-03-04 ENCOUNTER — HBO THERAPY VISIT (OUTPATIENT)
Dept: WOUND CARE | Facility: HOSPITAL | Age: 65
End: 2025-03-04
Attending: FAMILY MEDICINE
Payer: MEDICARE

## 2025-03-04 DIAGNOSIS — N30.40 RADIATION CYSTITIS: Primary | ICD-10-CM

## 2025-03-04 PROCEDURE — 99183 HYPERBARIC OXYGEN THERAPY: CPT | Performed by: FAMILY MEDICINE

## 2025-03-05 ENCOUNTER — HBO THERAPY VISIT (OUTPATIENT)
Dept: WOUND CARE | Facility: HOSPITAL | Age: 65
End: 2025-03-05
Attending: FAMILY MEDICINE
Payer: MEDICARE

## 2025-03-05 VITALS
HEART RATE: 72 BPM | TEMPERATURE: 98 F | SYSTOLIC BLOOD PRESSURE: 136 MMHG | RESPIRATION RATE: 12 BRPM | DIASTOLIC BLOOD PRESSURE: 86 MMHG

## 2025-03-05 DIAGNOSIS — N30.40 RADIATION CYSTITIS: Primary | ICD-10-CM

## 2025-03-05 DIAGNOSIS — Z85.46 HISTORY OF PROSTATE CANCER: ICD-10-CM

## 2025-03-05 NOTE — PROGRESS NOTES
Hyperbaric Daily Physician Note       Patient is here for Hyperbaric Oxygen therapy.   No complaints today.   Tolerating HBO with no side effects.     Blood Sugar:  Within normal limits.    Vitals:      3/4/2025     3:00 PM 3/4/2025     5:00 PM   Vitals History   /82 136/86   Pulse 79 72   Resp 12 12   Temp 97.8 °F (36.6 °C) 97.8 °F (36.6 °C)        DIAGNOSIS:       ICD-10-CM    1. Radiation cystitis  N30.40       2. History of prostate cancer  Z85.46           Pre/Post Treatment Evaluation      Pre-Treatment Lung Evaluation: Clear to auscultation bilaterally    Left Ear Pre-Treatment Evaluation  Left Ear Clear: Yes  Left Ear Intact: Yes  Left Cerumen Removal: No  Pre Left teed stgstrstastdstest:st st1st grade    Right Ear Pre-Treatment Evaluation  Right Ear Clear: Yes  Right Ear Intact: Yes  Right Cerumen Removal: No  Pre Right teed stgstrstastdstest:st st1st grade      Post-Treatment Lung Evaluation: Clear to auscultation bilaterally    Left Ear Post-Treatment Evaluation  Left Ear Clear: Yes  Left Ear Intact: Yes  Left Cerumen Removal: No  Post Left teed stgstrstastdstest:st st1st grade    Right Ear Post-Treatment Evaluation  Right Ear Clear: Yes  Right Ear Intact: Yes  Right Cerumen Removal: No  Post Right teed stgstrstastdstest:st st1st grade      I supervised this Hyperbaric treatment and was immediately available throughout the course of the treatment.  There is a trained emergency support team and ICU available at this facility to assist with complications.         Kwabena Harvey MD  3/5/2025  5:15 PM

## 2025-03-05 NOTE — PROGRESS NOTES
HBO Treatment Details      Patient Name:    Robert Andrew  MRN:  [id number]        YOB: 1960  Today's Date:  3/5/2025  Physician/Provider: Naresh Tuttle MD  Facility: [facility]  Diagnosis:   1. Radiation cystitis            Treatment Course Number: 10  Total Treatments Ordered:  30  Ordering Physician:   Indication: Soft tissue radionecrosis  Risk Factors: Seizure, Barotrauma  HBO Treatment Start Date:   HBO Treatment Number:  10  Treatment Length:120 Minutes  Treatment Depth: 2.5 JULEE  HBO Treatment End Date:   HBO Discharge Outcome: Treatment Complete        HBO Treatment Details:  In-Patient Visit: no  Chamber Type:  Hard sided monoplace  Chamber #: 2  HBO Dive Log:    Treatment Protocol:  2.5 JULEE with 10 min air break      Treatment Details:  Pressurized Rate: 2.0 psi/min  Dive Rate Down:  2.0 psi/min  Dive Rate Up:  2.0 psi/min  Started Compression: 1458  Reached Compression: 1508  Patient on Air: 1553  Patient Taken off Air:1603  Total Compression Time: 10 (Minutes)  Total Holding Time: 100 (Minutes)  Started Decompression: 1648  Reached Surface: 1508  Total Decompression Time: 10 (Minutes)  Total Airbreaks: 10 (Minutes)  Total Time of Treatment: 110 (Minutes)                         Vital Signs:  HBO Glucose:  - (Reference Range: 100-350 mg/dl)        Vitals:    03/04/25 1500 03/04/25 1700   BP: 128/82 136/86   Pulse: 79 72   Resp: 12 12   Temp: 97.8 °F (36.6 °C) 97.8 °F (36.6 °C)       Treatment Response:  Symptoms Noted During Treatment: None   Treatment Aborted:  No      Treatment Notes:

## 2025-03-05 NOTE — PROGRESS NOTES
[facility] HBO UTLIZATION REVIEW  NOTE  BETTY MONSIVAIS MD FAC  3/5/2025    Chief Complaint:   No chief complaint on file.      HPI:   Subjective   Robert Andrew is a 64 year old male coming in for a follow-up visit.    HPI    HBO # 11  Patient has been tolerating HBO well with no side effets  Symptoms improving.    Review of Systems  Negative except HPI   Denies chest pain / SOB / palpitations  Denies fever.     Allergies  Allergies[1]    Current Meds:  Current Outpatient Medications   Medication Sig Dispense Refill    ALPRAZolam 0.5 MG Oral Tab Take 1 tablet 30 min before hyperbaric oxygen therapy as needed for anxiety/claustrophobia 10 tablet 0    apixaban 5 MG Oral Tab Take 1 tablet (5 mg total) by mouth 2 (two) times daily.      amLODIPine 5 MG Oral Tab Take 1 tablet (5 mg total) by mouth daily.      tamsulosin 0.4 MG Oral Cap Take 1 capsule (0.4 mg total) by mouth daily.           EXAM:   Vital Signs  NOTED IN EPIC      ASSESSMENT AND PLAN:       1. Radiation cystitis    2. History of prostate cancer        MANAGEMENT      Continue HBO treatments as planned- total 30 #  Watch out for side effects.   Will apply for insurance PA if needed.   Answered all questions.     Outcome: Patient verbalizes understanding. Patient is notified to call with any questions, complications, allergies, or worsening or changing symptoms.  Patient is to call with any side effects or complications as a result of the treatments today.      DOCUMENTATION OF TIME SPENT: Code selection for this visit was based on time spent : 10 min on date of service in preparing to see the patient, obtaining and/or reviewing separately obtained history, performing a medically appropriate examination, counseling and educating the patient/family/caregiver, ordering medications or testing, referring and communicating with other healthcare providers, documenting clinical information in the E HR, independently interpreting results and  communicating results to the patient/family/caregiver and care coordination with the patient's other providers.    Followup: 1 DAY for HBO treatment    Comment: Please note this report has been produced using speech recognition software and may contain errors related to that system including errors in grammar, punctuation, and spelling, as well as words and phrases that may be inappropriate. If there are any questions or concerns please feel free to contact the dictating provider for clarification.    Kwabena Harvey MD  3/5/2025  5:15 PM                     [1]   Allergies  Allergen Reactions    Ciprofloxacin RASH

## 2025-03-06 ENCOUNTER — HBO THERAPY VISIT (OUTPATIENT)
Dept: WOUND CARE | Facility: HOSPITAL | Age: 65
End: 2025-03-06
Attending: FAMILY MEDICINE
Payer: MEDICARE

## 2025-03-06 VITALS
HEART RATE: 68 BPM | DIASTOLIC BLOOD PRESSURE: 80 MMHG | SYSTOLIC BLOOD PRESSURE: 132 MMHG | TEMPERATURE: 98 F | RESPIRATION RATE: 12 BRPM

## 2025-03-06 VITALS
DIASTOLIC BLOOD PRESSURE: 84 MMHG | TEMPERATURE: 99 F | SYSTOLIC BLOOD PRESSURE: 132 MMHG | RESPIRATION RATE: 14 BRPM | HEART RATE: 76 BPM

## 2025-03-06 DIAGNOSIS — N30.40 RADIATION CYSTITIS: Primary | ICD-10-CM

## 2025-03-06 NOTE — PROGRESS NOTES
HBO Treatment Details      Patient Name:    Robert Andrew  MRN:  [id number]        YOB: 1960  Today's Date:  3/6/2025  Physician/Provider: Kwabena Harvey MD  Facility: [facility]  Diagnosis:   1. Radiation cystitis    2. History of prostate cancer            Treatment Course Number: 11  Total Treatments Ordered:  30  Ordering Physician:   Indication: Soft tissue radionecrosis  Risk Factors: Seizure, Barotrauma  HBO Treatment Start Date:   HBO Treatment Number:  11  Treatment Length:120 Minutes  Treatment Depth: 2.5 JULEE  HBO Treatment End Date:   HBO Discharge Outcome: Treatment Complete        HBO Treatment Details:  In-Patient Visit: no  Chamber Type:  Hard sided monoplace  Chamber #: 2  HBO Dive Log:    Treatment Protocol:  2.5 JULEE with 10 min air break      Treatment Details:  Pressurized Rate: 2.0 psi/min  Dive Rate Down:  2.0 psi/min  Dive Rate Up:  2.0 psi/min  Started Compression: 1508  Reached Compression: 1518  Patient on Air: 1603  Patient Taken off Air:1613  Total Compression Time: 10 (Minutes)  Total Holding Time: 100 (Minutes)  Started Decompression: 1658  Reached Surface: 1518  Total Decompression Time: 10 (Minutes)  Total Airbreaks: 10 (Minutes)  Total Time of Treatment: 110 (Minutes)                         Vital Signs:  HBO Glucose:  - (Reference Range: 100-350 mg/dl)        Vitals:    03/05/25 0735 03/05/25 1711   BP: 126/86 132/84   Pulse: 76 76   Resp: 10 14   Temp: 97.5 °F (36.4 °C) 98.6 °F (37 °C)       Treatment Response:  Symptoms Noted During Treatment: None   Treatment Aborted:  No      Treatment Notes:

## 2025-03-06 NOTE — PROGRESS NOTES
HBO Treatment Details      Patient Name:    Robert Andrew  MRN:  [id number]        YOB: 1960  Today's Date:  3/6/2025  Physician/Provider: Naresh Tuttle MD  Facility: [facility]  Diagnosis:   1. Radiation cystitis            Treatment Course Number: 12  Total Treatments Ordered:  30  Ordering Physician:   Indication: Soft tissue radionecrosis  Risk Factors: Seizure, Barotrauma  HBO Treatment Start Date:   HBO Treatment Number:  12  Treatment Length:120 Minutes  Treatment Depth: 2.5 JULEE  HBO Treatment End Date:   HBO Discharge Outcome: Treatment Complete        HBO Treatment Details:  In-Patient Visit: no  Chamber Type:  Hard sided monoplace  Chamber #: 2  HBO Dive Log:    Treatment Protocol:  2.5 JULEE with 10 min air break      Treatment Details:  Pressurized Rate: 2.0 psi/min  Dive Rate Down:  2.0 psi/min  Dive Rate Up:  2.0 psi/min  Started Compression: 1444  Reached Compression: 1454  Patient on Air: 1539  Patient Taken off Air:1549  Total Compression Time: 10 (Minutes)  Total Holding Time: 100 (Minutes)  Started Decompression: 1634  Reached Surface: 1454  Total Decompression Time: 10 (Minutes)  Total Airbreaks: 10 (Minutes)  Total Time of Treatment: 110 (Minutes)                         Vital Signs:  HBO Glucose:  - (Reference Range: 100-350 mg/dl)        Vitals:    03/06/25 1441 03/06/25 1718   BP: 132/80 132/80   Pulse: 72 68   Resp: 12 12   Temp: 98 °F (36.7 °C) 97.7 °F (36.5 °C)       Treatment Response:  Symptoms Noted During Treatment: None   Treatment Aborted:  No      Treatment Notes:

## 2025-03-07 ENCOUNTER — HBO THERAPY VISIT (OUTPATIENT)
Dept: WOUND CARE | Facility: HOSPITAL | Age: 65
End: 2025-03-07
Attending: FAMILY MEDICINE
Payer: MEDICARE

## 2025-03-07 VITALS
SYSTOLIC BLOOD PRESSURE: 138 MMHG | TEMPERATURE: 97 F | RESPIRATION RATE: 12 BRPM | HEART RATE: 64 BPM | DIASTOLIC BLOOD PRESSURE: 72 MMHG

## 2025-03-07 DIAGNOSIS — N30.40 RADIATION CYSTITIS: Primary | ICD-10-CM

## 2025-03-07 DIAGNOSIS — Z85.46 HISTORY OF PROSTATE CANCER: ICD-10-CM

## 2025-03-07 DIAGNOSIS — Z92.3 HISTORY OF THERAPEUTIC RADIATION: ICD-10-CM

## 2025-03-07 NOTE — PROGRESS NOTES
Hyperbaric Daily Physician Note       Patient is here for Hyperbaric Oxygen therapy.   No complaints today.   Tolerating HBO with no side effects.     Blood Sugar:  Within normal limits.    Vitals:      3/6/2025     5:18 PM 3/7/2025     3:00 PM   Vitals History   /80 110/58   Pulse 68 78   Resp 12 14   Temp 97.7 °F (36.5 °C) 97.9 °F (36.6 °C)        DIAGNOSIS:       ICD-10-CM    1. Radiation cystitis  N30.40       2. History of therapeutic radiation  Z92.3       3. History of prostate cancer  Z85.46           Pre/Post Treatment Evaluation      Pre-Treatment Lung Evaluation: Clear to auscultation bilaterally    Left Ear Pre-Treatment Evaluation  Left Ear Clear: Yes  Left Ear Intact: Yes  Left Cerumen Removal: No  Pre Left teed stgstrstastdstest:st st1st grade    Right Ear Pre-Treatment Evaluation  Right Ear Clear: Yes  Right Ear Intact: Yes  Right Cerumen Removal: No  Pre Right teed stgstrstastdstest:st st1st grade      Post-Treatment Lung Evaluation: Clear to auscultation bilaterally    Left Ear Post-Treatment Evaluation  Left Ear Clear: Yes  Left Ear Intact: Yes  Left Cerumen Removal: No  Post Left teed stgstrstastdstest:st st1st grade    Right Ear Post-Treatment Evaluation  Right Ear Clear: Yes  Right Ear Intact: Yes  Right Cerumen Removal: No  Post Right teed stgstrstastdstest:st st1st grade      I supervised this Hyperbaric treatment and was immediately available throughout the course of the treatment.  There is a trained emergency support team and ICU available at this facility to assist with complications.         Kwabena Harvey MD  3/7/2025  5:21 PM

## 2025-03-07 NOTE — PROGRESS NOTES
HBO Treatment Details      Patient Name:    Robert Andrew  MRN:  [id number]        YOB: 1960  Today's Date:  3/7/2025  Physician/Provider: Kwabena Harvey MD  Facility: [facility]  Diagnosis:   1. Radiation cystitis    2. History of therapeutic radiation    3. History of prostate cancer            Treatment Course Number: 13  Total Treatments Ordered:  30    Indication:  strn  Risk Factors:  barotrauma, seizure  HBO Discharge Outcome: No Change        HBO Treatment Details:  In-Patient Visit: no  Chamber Type:  hard sided monoplace  Chamber #: 2  Treatment Protocol:  2.5 JULEE with 10 minute air break      Treatment Details:  Pressurized Rate:    Dive Rate Down:  2.0 psi  Dive Rate Up:  2.0 psi  Started Compression:  1502  Reached Compression:  1512  Patient on Air:  1557  Patient Taken off Air: 1607  Started Decompression:  1652  Total Time of Treatment:   120 (Minutes)                         Vital Signs:         Vitals:    03/07/25 1500 03/07/25 1705   BP: 110/58 138/72   Pulse: 78 64   Resp: 14 12   Temp: 97.9 °F (36.6 °C) 97.4 °F (36.3 °C)       Treatment Response:  Symptoms Noted During Treatment:     Treatment Aborted:  no

## 2025-03-10 ENCOUNTER — HBO THERAPY VISIT (OUTPATIENT)
Dept: WOUND CARE | Facility: HOSPITAL | Age: 65
End: 2025-03-10
Attending: FAMILY MEDICINE
Payer: MEDICARE

## 2025-03-10 DIAGNOSIS — Z92.3 HISTORY OF THERAPEUTIC RADIATION: ICD-10-CM

## 2025-03-10 DIAGNOSIS — Z85.46 HISTORY OF PROSTATE CANCER: ICD-10-CM

## 2025-03-10 DIAGNOSIS — N30.40 RADIATION CYSTITIS: Primary | ICD-10-CM

## 2025-03-10 NOTE — PROGRESS NOTES
Hyperbaric Daily Physician Note       Patient is here for Hyperbaric Oxygen therapy.   No complaints today.   Tolerating HBO with no side effects.     Blood Sugar:  Within normal limits.    Vitals:      3/7/2025     3:00 PM 3/7/2025     5:05 PM   Vitals History   /58 138/72   Pulse 78 64   Resp 14 12   Temp 97.9 °F (36.6 °C) 97.4 °F (36.3 °C)        DIAGNOSIS:       ICD-10-CM    1. Radiation cystitis  N30.40       2. History of therapeutic radiation  Z92.3       3. History of prostate cancer  Z85.46           Pre/Post Treatment Evaluation      Pre-Treatment Lung Evaluation: Clear to auscultation bilaterally    Left Ear Pre-Treatment Evaluation  Left Ear Clear: Yes  Left Ear Intact: Yes  Left Cerumen Removal: No  Pre Left teed stgstrstastdstest:st st1st grade    Right Ear Pre-Treatment Evaluation  Right Ear Clear: Yes  Right Ear Intact: Yes  Right Cerumen Removal: No  Pre Right teed stgstrstastdstest:st st1st grade      Post-Treatment Lung Evaluation: Clear to auscultation bilaterally    Left Ear Post-Treatment Evaluation  Left Ear Clear: Yes  Left Ear Intact: Yes  Left Cerumen Removal: No  Post Left teed stgstrstastdstest:st st1st grade    Right Ear Post-Treatment Evaluation  Right Ear Clear: Yes  Right Ear Intact: Yes  Right Cerumen Removal: No  Post Right teed stgstrstastdstest:st st1st grade      I supervised this Hyperbaric treatment and was immediately available throughout the course of the treatment.  There is a trained emergency support team and ICU available at this facility to assist with complications.         Kwabena Harvey MD  3/10/2025  4:52 PM

## 2025-03-11 ENCOUNTER — HBO THERAPY VISIT (OUTPATIENT)
Dept: WOUND CARE | Facility: HOSPITAL | Age: 65
End: 2025-03-11
Attending: FAMILY MEDICINE
Payer: MEDICARE

## 2025-03-11 DIAGNOSIS — N30.40 RADIATION CYSTITIS: Primary | ICD-10-CM

## 2025-03-11 DIAGNOSIS — Z85.46 HISTORY OF PROSTATE CANCER: ICD-10-CM

## 2025-03-11 DIAGNOSIS — Z92.3 HISTORY OF THERAPEUTIC RADIATION: ICD-10-CM

## 2025-03-11 PROCEDURE — 99183 HYPERBARIC OXYGEN THERAPY: CPT | Performed by: FAMILY MEDICINE

## 2025-03-12 ENCOUNTER — HBO THERAPY VISIT (OUTPATIENT)
Dept: WOUND CARE | Facility: HOSPITAL | Age: 65
End: 2025-03-12
Attending: FAMILY MEDICINE
Payer: MEDICARE

## 2025-03-12 VITALS
RESPIRATION RATE: 12 BRPM | DIASTOLIC BLOOD PRESSURE: 84 MMHG | SYSTOLIC BLOOD PRESSURE: 138 MMHG | HEART RATE: 72 BPM | TEMPERATURE: 98 F

## 2025-03-12 DIAGNOSIS — Z92.3 HISTORY OF THERAPEUTIC RADIATION: ICD-10-CM

## 2025-03-12 DIAGNOSIS — N30.40 RADIATION CYSTITIS: Primary | ICD-10-CM

## 2025-03-12 NOTE — PROGRESS NOTES
Hyperbaric Daily Physician Note       Patient is here for Hyperbaric Oxygen therapy.   No complaints today.   Tolerating HBO with no side effects.     Blood Sugar:  Within normal limits.  Lab Results   Component Value Date    PGLU 111 (H) 02/17/2025       Vitals:      3/11/2025     2:51 PM 3/11/2025     5:00 PM   Vitals History   /82 138/84   Pulse 82 72   Resp 14 12   Temp 97.7 °F (36.5 °C) 97.6 °F (36.4 °C)        DIAGNOSIS:       ICD-10-CM    1. Radiation cystitis  N30.40       2. History of therapeutic radiation  Z92.3           Pre/Post Treatment Evaluation      Pre-Treatment Lung Evaluation: Clear to auscultation bilaterally    Left Ear Pre-Treatment Evaluation  Left Ear Clear: Yes  Left Ear Intact: Yes  Left Cerumen Removal: No  Pre Left teed stgstrstastdstest:st st1st grade    Right Ear Pre-Treatment Evaluation  Right Ear Clear: Yes  Right Ear Intact: Yes  Right Cerumen Removal: No  Pre Right teed stgstrstastdstest:st st1st grade      Post-Treatment Lung Evaluation: Clear to auscultation bilaterally    Left Ear Post-Treatment Evaluation  Left Ear Clear: Yes  Left Ear Intact: Yes  Left Cerumen Removal: No  Post Left teed stgstrstastdstest:st st1st grade    Right Ear Post-Treatment Evaluation  Right Ear Clear: Yes  Right Ear Intact: Yes  Right Cerumen Removal: No  Post Right teed stgstrstastdstest:st st1st grade      I supervised this Hyperbaric treatment and was immediately available throughout the course of the treatment.  There is a trained emergency support team and ICU available at this facility to assist with complications.      Return for HBO treatments as scheduled.      Kwabena Harvey MD  3/12/2025  4:40 PM

## 2025-03-12 NOTE — PROGRESS NOTES
HBO Treatment Details      Patient Name:    Robert Andrew  MRN:  [id number]        YOB: 1960  Today's Date:  3/12/2025  Physician/Provider: Naresh Tuttle MD  Facility: [facility]  Diagnosis:   1. Radiation cystitis    2. History of therapeutic radiation    3. History of prostate cancer            Treatment Course Number: 15  Total Treatments Ordered:  30  Ordering Physician:   Indication: Soft tissue radionecrosis  Risk Factors: Seizure, Barotrauma  HBO Treatment Start Date:   HBO Treatment Number:  15  Treatment Length:120 Minutes  Treatment Depth: 2.5 JULEE  HBO Treatment End Date:   HBO Discharge Outcome: Treatment Complete        HBO Treatment Details:  In-Patient Visit: no  Chamber Type:  Hard sided monoplace  Chamber #: 2  HBO Dive Log:    Treatment Protocol:  2.5 JULEE with 10 min air break      Treatment Details:  Pressurized Rate: 2.0 psi/min  Dive Rate Down:  2.0 psi/min  Dive Rate Up:  2.0 psi/min  Started Compression: 1455  Reached Compression: 1505  Patient on Air: 1550  Patient Taken off Air:1600  Total Compression Time: 10 (Minutes)  Total Holding Time: 100 (Minutes)  Started Decompression: 1645  Reached Surface: 1505  Total Decompression Time: 10 (Minutes)  Total Airbreaks: 10 (Minutes)  Total Time of Treatment: 110 (Minutes)                         Vital Signs:  HBO Glucose:  - (Reference Range: 100-350 mg/dl)        Vitals:    03/11/25 1451 03/11/25 1700   BP: 124/82 138/84   Pulse: 82 72   Resp: 14 12   Temp: 97.7 °F (36.5 °C) 97.6 °F (36.4 °C)       Treatment Response:  Symptoms Noted During Treatment: None   Treatment Aborted:  No      Treatment Notes:

## 2025-03-13 ENCOUNTER — HBO THERAPY VISIT (OUTPATIENT)
Dept: WOUND CARE | Facility: HOSPITAL | Age: 65
End: 2025-03-13
Attending: FAMILY MEDICINE
Payer: MEDICARE

## 2025-03-13 VITALS
TEMPERATURE: 97 F | DIASTOLIC BLOOD PRESSURE: 84 MMHG | HEART RATE: 69 BPM | RESPIRATION RATE: 14 BRPM | SYSTOLIC BLOOD PRESSURE: 144 MMHG

## 2025-03-13 VITALS
DIASTOLIC BLOOD PRESSURE: 86 MMHG | HEART RATE: 72 BPM | SYSTOLIC BLOOD PRESSURE: 142 MMHG | RESPIRATION RATE: 12 BRPM | TEMPERATURE: 98 F

## 2025-03-13 DIAGNOSIS — N30.40 RADIATION CYSTITIS: Primary | ICD-10-CM

## 2025-03-13 DIAGNOSIS — Z85.46 HISTORY OF PROSTATE CANCER: ICD-10-CM

## 2025-03-13 DIAGNOSIS — Z92.3 HISTORY OF THERAPEUTIC RADIATION: ICD-10-CM

## 2025-03-13 NOTE — PROGRESS NOTES
HBO Treatment Details      Patient Name:    Robert Andrew  MRN:  [id number]        YOB: 1960  Today's Date:  3/13/2025  Physician/Provider: Naresh Tuttle MD  Facility: [facility]  Diagnosis:   1. Radiation cystitis    2. History of therapeutic radiation    3. History of prostate cancer            Treatment Course Number: 17  Total Treatments Ordered:  30  Ordering Physician:   Indication: Soft tissue radionecrosis  Risk Factors: Seizure, Barotrauma  HBO Treatment Start Date:   HBO Treatment Number:  17  Treatment Length:120 Minutes  Treatment Depth: 2.5 JULEE  HBO Treatment End Date:   HBO Discharge Outcome: Treatment Complete        HBO Treatment Details:  In-Patient Visit: no  Chamber Type:  Hard sided monoplace  Chamber #: 2  HBO Dive Log:    Treatment Protocol:  2.5 JULEE with 10 min air break      Treatment Details:  Pressurized Rate: 2.0 psi/min  Dive Rate Down:  2.0 psi/min  Dive Rate Up:  2.0 psi/min  Started Compression: 1509  Reached Compression: 1519  Patient on Air: 1604  Patient Taken off Air:1614  Total Compression Time: 10 (Minutes)  Total Holding Time: 100 (Minutes)  Started Decompression: 1659  Reached Surface: 1519  Total Decompression Time: 10 (Minutes)  Total Airbreaks: 10 (Minutes)  Total Time of Treatment: 110 (Minutes)                         Vital Signs:  HBO Glucose:  - (Reference Range: 100-350 mg/dl)        Vitals:    03/13/25 1502 03/13/25 1726   BP: 122/88 142/86   Pulse: 82 72   Resp: 12    Temp: 97.9 °F (36.6 °C) 97.6 °F (36.4 °C)       Treatment Response:  Symptoms Noted During Treatment: None   Treatment Aborted:  No      Treatment Notes:

## 2025-03-13 NOTE — PROGRESS NOTES
HBO Treatment Details      Patient Name:    Robert Andrew  MRN:  [id number]        YOB: 1960  Today's Date:  3/13/2025  Physician/Provider: Kwabena Harvey MD  Facility: [facility]  Diagnosis:   1. Radiation cystitis    2. History of therapeutic radiation            Treatment Course Number: 16  Total Treatments Ordered:  30  Ordering Physician:   Indication: Soft tissue radionecrosis  Risk Factors: Seizure, Barotrauma  HBO Treatment Start Date:   HBO Treatment Number:  16  Treatment Length:120 Minutes  Treatment Depth: 2.5 JULEE  HBO Treatment End Date:   HBO Discharge Outcome: Treatment Complete        HBO Treatment Details:  In-Patient Visit: no  Chamber Type:  Hard sided monoplace  Chamber #: 2  HBO Dive Log:    Treatment Protocol:  2.5 JULEE with 10 min air break      Treatment Details:  Pressurized Rate: 2.0 psi/min  Dive Rate Down:  2.0 psi/min  Dive Rate Up:  2.0 psi/min  Started Compression: 1457  Reached Compression: 1507  Patient on Air: 1542  Patient Taken off Air:1552  Total Compression Time: 10 (Minutes)  Total Holding Time: 90 (Minutes)  Started Decompression: 1637  Reached Surface: 1507  Total Decompression Time: 10 (Minutes)  Total Airbreaks: 10 (Minutes)  Total Time of Treatment: 100 (Minutes)                         Vital Signs:  HBO Glucose:  - (Reference Range: 100-350 mg/dl)        Vitals:    03/12/25 1453 03/12/25 1651   BP: 122/84 144/84   Pulse: 66 69   Resp: 12 14   Temp: 97.7 °F (36.5 °C) 97.4 °F (36.3 °C)       Treatment Response:  Symptoms Noted During Treatment: None   Treatment Aborted:  No      Treatment Notes:

## 2025-03-14 ENCOUNTER — APPOINTMENT (OUTPATIENT)
Dept: WOUND CARE | Facility: HOSPITAL | Age: 65
End: 2025-03-14
Attending: FAMILY MEDICINE
Payer: MEDICARE

## 2025-03-17 ENCOUNTER — HBO THERAPY VISIT (OUTPATIENT)
Dept: WOUND CARE | Facility: HOSPITAL | Age: 65
End: 2025-03-17
Attending: FAMILY MEDICINE
Payer: MEDICARE

## 2025-03-17 DIAGNOSIS — Z92.3 HISTORY OF THERAPEUTIC RADIATION: ICD-10-CM

## 2025-03-17 DIAGNOSIS — Z85.46 HISTORY OF PROSTATE CANCER: ICD-10-CM

## 2025-03-17 DIAGNOSIS — N30.40 RADIATION CYSTITIS: Primary | ICD-10-CM

## 2025-03-17 NOTE — PROGRESS NOTES
Hyperbaric Daily Physician Note       # 18  Patient is here for Hyperbaric Oxygen therapy.   No complaints today. Mentioned small anxiety about being in the HBO chamber  Tolerating HBO with no side effects.     Blood Sugar:  Within normal limits.  Lab Results   Component Value Date    PGLU 111 (H) 02/17/2025       Vitals:      3/13/2025     3:02 PM 3/13/2025     5:26 PM   Vitals History   /88 142/86   Pulse 82 72   Resp 12    Temp 97.9 °F (36.6 °C) 97.6 °F (36.4 °C)        DIAGNOSIS:       ICD-10-CM    1. Radiation cystitis  N30.40       2. History of therapeutic radiation  Z92.3       3. History of prostate cancer  Z85.46           Pre/Post Treatment Evaluation      Pre-Treatment Lung Evaluation: Clear to auscultation bilaterally    Left Ear Pre-Treatment Evaluation  Left Ear Clear: Yes  Left Ear Intact: Yes  Left Cerumen Removal: No  Pre Left teed stgstrstastdstest:st st1st grade    Right Ear Pre-Treatment Evaluation  Right Ear Clear: Yes  Right Ear Intact: Yes  Right Cerumen Removal: No  Pre Right teed stgstrstastdstest:st st1st grade      Post-Treatment Lung Evaluation: Clear to auscultation bilaterally    Left Ear Post-Treatment Evaluation  Left Ear Clear: Yes  Left Ear Intact: Yes  Left Cerumen Removal: No  Post Left teed stgstrstastdstest:st st1st grade    Right Ear Post-Treatment Evaluation  Right Ear Clear: Yes  Right Ear Intact: Yes  Right Cerumen Removal: No  Post Right teed stgstrstastdstest:st st1st grade      I supervised this Hyperbaric treatment and was immediately available throughout the course of the treatment.  There is a trained emergency support team and ICU available at this facility to assist with complications.      Return for HBO treatments as scheduled.      Kwabena Harvey MD  3/17/2025  5:00 PM

## 2025-03-18 ENCOUNTER — APPOINTMENT (OUTPATIENT)
Dept: WOUND CARE | Facility: HOSPITAL | Age: 65
End: 2025-03-18
Attending: FAMILY MEDICINE
Payer: MEDICARE

## 2025-03-18 DIAGNOSIS — N30.40 RADIATION CYSTITIS: Primary | ICD-10-CM

## 2025-03-18 PROCEDURE — 99183 HYPERBARIC OXYGEN THERAPY: CPT | Performed by: FAMILY MEDICINE

## 2025-03-19 ENCOUNTER — APPOINTMENT (OUTPATIENT)
Dept: WOUND CARE | Facility: HOSPITAL | Age: 65
End: 2025-03-19
Attending: FAMILY MEDICINE
Payer: MEDICARE

## 2025-03-19 VITALS
TEMPERATURE: 97 F | RESPIRATION RATE: 12 BRPM | DIASTOLIC BLOOD PRESSURE: 84 MMHG | HEART RATE: 84 BPM | SYSTOLIC BLOOD PRESSURE: 136 MMHG

## 2025-03-19 DIAGNOSIS — Z92.3 HISTORY OF THERAPEUTIC RADIATION: ICD-10-CM

## 2025-03-19 DIAGNOSIS — N30.40 RADIATION CYSTITIS: Primary | ICD-10-CM

## 2025-03-19 NOTE — PROGRESS NOTES
Hyperbaric Daily Physician Note       Patient is here for Hyperbaric Oxygen therapy.   No complaints today.   Tolerating HBO with no side effects.     Blood Sugar:  Within normal limits.  Lab Results   Component Value Date    PGLU 111 (H) 02/17/2025       Vitals:      3/18/2025     3:11 PM 3/18/2025     7:28 PM   Vitals History   /81 136/84   Pulse 84 84   Resp 12 12   Temp 98.1 °F (36.7 °C) 97.4 °F (36.3 °C)        DIAGNOSIS:       ICD-10-CM    1. Radiation cystitis  N30.40       2. History of therapeutic radiation  Z92.3           Pre/Post Treatment Evaluation      Pre-Treatment Lung Evaluation: Clear to auscultation bilaterally    Left Ear Pre-Treatment Evaluation  Left Ear Clear: Yes  Left Ear Intact: Yes  Left Cerumen Removal: No  Pre Left teed stgstrstastdstest:st st1st grade    Right Ear Pre-Treatment Evaluation  Right Ear Clear: Yes  Right Ear Intact: Yes  Right Cerumen Removal: No  Pre Right teed stgstrstastdstest:st st1st grade      Post-Treatment Lung Evaluation: Clear to auscultation bilaterally    Left Ear Post-Treatment Evaluation  Left Ear Clear: Yes  Left Ear Intact: Yes  Left Cerumen Removal: No  Post Left teed stgstrstastdstest:st st1st grade    Right Ear Post-Treatment Evaluation  Right Ear Clear: Yes  Right Ear Intact: Yes  Right Cerumen Removal: No  Post Right teed stgstrstastdstest:st st1st grade      I supervised this Hyperbaric treatment and was immediately available throughout the course of the treatment.  There is a trained emergency support team and ICU available at this facility to assist with complications.      Return for HBO treatments as scheduled.      Kwabena Harvey MD  3/19/2025  5:35 PM

## 2025-03-19 NOTE — PROGRESS NOTES
[facility] HBO UTLIZATION REVIEW  NOTE  BETTY MONSIVAIS MD FAC  3/19/2025    Chief Complaint:   No chief complaint on file.      HPI:   Subjective   Robert Andrew is a 64 year old male coming in for a follow-up visit.    HPI    HBO # 21  Patient has been tolerating HBO well with no side effets  Symptoms improving.    Review of Systems  Negative except HPI   Denies chest pain / SOB / palpitations  Denies fever.     Allergies  Allergies[1]    Current Meds:  Current Outpatient Medications   Medication Sig Dispense Refill    ALPRAZolam 0.5 MG Oral Tab Take 1 tablet 30 min before hyperbaric oxygen therapy as needed for anxiety/claustrophobia 10 tablet 0    apixaban 5 MG Oral Tab Take 1 tablet (5 mg total) by mouth 2 (two) times daily.      amLODIPine 5 MG Oral Tab Take 1 tablet (5 mg total) by mouth daily.      tamsulosin 0.4 MG Oral Cap Take 1 capsule (0.4 mg total) by mouth daily.           EXAM:   Vital Signs  NOTED IN EPIC      ASSESSMENT AND PLAN:       1. Radiation cystitis    2. History of therapeutic radiation        MANAGEMENT      Continue HBO treatments as planned- total 30 #  Watch out for side effects.   Will apply for insurance PA if needed.   Answered all questions.     Outcome: Patient verbalizes understanding. Patient is notified to call with any questions, complications, allergies, or worsening or changing symptoms.  Patient is to call with any side effects or complications as a result of the treatments today.      DOCUMENTATION OF TIME SPENT: Code selection for this visit was based on time spent : 10 min on date of service in preparing to see the patient, obtaining and/or reviewing separately obtained history, performing a medically appropriate examination, counseling and educating the patient/family/caregiver, ordering medications or testing, referring and communicating with other healthcare providers, documenting clinical information in the E HR, independently interpreting results and  communicating results to the patient/family/caregiver and care coordination with the patient's other providers.    Followup: 1 DAY for HBO treatment    Comment: Please note this report has been produced using speech recognition software and may contain errors related to that system including errors in grammar, punctuation, and spelling, as well as words and phrases that may be inappropriate. If there are any questions or concerns please feel free to contact the dictating provider for clarification.    Kwabena Harvey MD  3/19/2025  5:41 PM                           [1]   Allergies  Allergen Reactions    Ciprofloxacin RASH

## 2025-03-19 NOTE — PROGRESS NOTES
HBO Treatment Details      Patient Name:    Robert Andrew  MRN:  [id number]        YOB: 1960  Today's Date:  3/19/2025  Physician/Provider: Naresh Tuttle MD  Facility: [facility]  Diagnosis:   1. Radiation cystitis            Treatment Course Number: 19  Total Treatments Ordered:  30  Ordering Physician:   Indication: Soft tissue radionecrosis  Risk Factors: Seizure, Barotrauma  HBO Treatment Start Date:   HBO Treatment Number:  19  Treatment Length:120 Minutes  Treatment Depth: 2.5 JULEE  HBO Treatment End Date:   HBO Discharge Outcome: Treatment Complete        HBO Treatment Details:  In-Patient Visit: no  Chamber Type:  Hard sided monoplace  Chamber #: 2  HBO Dive Lo  Treatment Protocol:  2.5 JULEE with 10 min air break      Treatment Details:  Pressurized Rate: 2.0 psi/min  Dive Rate Down:  2.0 psi/min  Dive Rate Up:  2.0 psi/min  Started Compression: 1514  Reached Compression: 1524  Patient on Air: 1619  Patient Taken off Air:1629  Total Compression Time: 10 (Minutes)  Total Holding Time: 110 (Minutes)  Started Decompression: 1714  Reached Surface: 1524  Total Decompression Time: 10 (Minutes)  Total Airbreaks: 10 (Minutes)  Total Time of Treatment: 120 (Minutes)                         Vital Signs:  HBO Glucose:  - (Reference Range: 100-350 mg/dl)        Vitals:    25 1511 25 1928   BP: 132/81 136/84   Pulse: 84 84   Resp: 12 12   Temp: 98.1 °F (36.7 °C) 97.4 °F (36.3 °C)       Treatment Response:  Symptoms Noted During Treatment: None   Treatment Aborted:  No      Treatment Notes:

## 2025-03-20 ENCOUNTER — APPOINTMENT (OUTPATIENT)
Dept: WOUND CARE | Facility: HOSPITAL | Age: 65
End: 2025-03-20
Attending: FAMILY MEDICINE
Payer: MEDICARE

## 2025-03-20 VITALS
RESPIRATION RATE: 10 BRPM | SYSTOLIC BLOOD PRESSURE: 138 MMHG | TEMPERATURE: 98 F | DIASTOLIC BLOOD PRESSURE: 88 MMHG | HEART RATE: 68 BPM

## 2025-03-20 DIAGNOSIS — N30.40 RADIATION CYSTITIS: Primary | ICD-10-CM

## 2025-03-20 DIAGNOSIS — Z92.3 HISTORY OF THERAPEUTIC RADIATION: ICD-10-CM

## 2025-03-20 PROCEDURE — 99183 HYPERBARIC OXYGEN THERAPY: CPT | Performed by: FAMILY MEDICINE

## 2025-03-20 NOTE — PROGRESS NOTES
HBO Treatment Details      Patient Name:    Robert Andrew  MRN:  [id number]        YOB: 1960  Today's Date:  3/20/2025  Physician/Provider: Kwabena Harvey MD  Facility: [facility]  Diagnosis:   1. Radiation cystitis    2. History of therapeutic radiation            Treatment Course Number: 20  Total Treatments Ordered:  30  Ordering Physician:   Indication: Soft tissue radionecrosis  Risk Factors: Seizure, Barotrauma  HBO Treatment Start Date:   HBO Treatment Number:  20  Treatment Length:120 Minutes  Treatment Depth: 2.5 JULEE  HBO Treatment End Date:   HBO Discharge Outcome: Treatment Complete        HBO Treatment Details:  In-Patient Visit: no  Chamber Type:  Hard sided monoplace  Chamber #: 2  HBO Dive Lo  Treatment Protocol:  2.5 JULEE with 10 min air break      Treatment Details:  Pressurized Rate: 2.0 psi/min  Dive Rate Down:  2.0 psi/min  Dive Rate Up:  2.0 psi/min  Started Compression: 1447  Reached Compression: 1457  Patient on Air: 1542  Patient Taken off Air:1552  Total Compression Time: 10 (Minutes)  Total Holding Time: 100 (Minutes)  Started Decompression: 1637  Reached Surface: 1457  Total Decompression Time: 10 (Minutes)  Total Airbreaks: 10 (Minutes)  Total Time of Treatment: 110 (Minutes)                         Vital Signs:  HBO Glucose:  - (Reference Range: 100-350 mg/dl)        Vitals:    25 0836 25 1649   BP: 138/84 138/88   Pulse: 84 68   Resp: 10 10   Temp: 98.5 °F (36.9 °C) 97.8 °F (36.6 °C)       Treatment Response:  Symptoms Noted During Treatment: None   Treatment Aborted:  No      Treatment Notes:

## 2025-03-21 ENCOUNTER — HBO THERAPY VISIT (OUTPATIENT)
Dept: WOUND CARE | Facility: HOSPITAL | Age: 65
End: 2025-03-21
Attending: FAMILY MEDICINE
Payer: MEDICARE

## 2025-03-21 VITALS
SYSTOLIC BLOOD PRESSURE: 146 MMHG | RESPIRATION RATE: 16 BRPM | HEART RATE: 66 BPM | DIASTOLIC BLOOD PRESSURE: 92 MMHG | TEMPERATURE: 97 F

## 2025-03-21 DIAGNOSIS — Z92.3 HISTORY OF THERAPEUTIC RADIATION: ICD-10-CM

## 2025-03-21 DIAGNOSIS — N30.40 RADIATION CYSTITIS: Primary | ICD-10-CM

## 2025-03-21 NOTE — PROGRESS NOTES
Hyperbaric Daily Physician Note       Patient is here for Hyperbaric Oxygen therapy.   No complaints today.   Tolerating HBO with no side effects.     Blood Sugar:  Within normal limits.  Lab Results   Component Value Date    PGLU 111 (H) 02/17/2025       Vitals:      3/19/2025     4:49 PM 3/21/2025     2:45 PM   Vitals History   /88 124/78   Pulse 68 68   Resp 10 16   Temp 97.8 °F (36.6 °C) 98 °F (36.7 °C)        DIAGNOSIS:       ICD-10-CM    1. Radiation cystitis  N30.40       2. History of therapeutic radiation  Z92.3           Pre/Post Treatment Evaluation      Pre-Treatment Lung Evaluation: Clear to auscultation bilaterally    Left Ear Pre-Treatment Evaluation  Left Ear Clear: Yes  Left Ear Intact: Yes  Left Cerumen Removal: No  Pre Left teed stgstrstastdstest:st st1st grade    Right Ear Pre-Treatment Evaluation  Right Ear Clear: Yes  Right Ear Intact: Yes  Right Cerumen Removal: No  Pre Right teed stgstrstastdstest:st st1st grade      Post-Treatment Lung Evaluation: Clear to auscultation bilaterally    Left Ear Post-Treatment Evaluation  Left Ear Clear: Yes  Left Ear Intact: Yes  Left Cerumen Removal: No  Post Left teed stgstrstastdstest:st st1st grade    Right Ear Post-Treatment Evaluation  Right Ear Clear: Yes  Right Ear Intact: Yes  Right Cerumen Removal: No  Post Right teed stgstrstastdstest:st st1st grade      I supervised this Hyperbaric treatment and was immediately available throughout the course of the treatment.  There is a trained emergency support team and ICU available at this facility to assist with complications.      Return for HBO treatments as scheduled.      Kwabena Harvey MD  3/21/2025  5:01 PM

## 2025-03-21 NOTE — PROGRESS NOTES
HBO Treatment Details      Patient Name:    Robert Andrew  MRN:  [id number]        YOB: 1960  Today's Date:  3/21/2025  Physician/Provider: Kwabena Harvey MD  Facility: [facility]  Diagnosis:   1. Radiation cystitis    2. History of therapeutic radiation            Treatment Course Number: 22  Total Treatments Ordered:  30  Ordering Physician:   Indication: Soft tissue radionecrosis  Risk Factors: Barotrauma, Seizure  HBO Treatment Start Date:   HBO Treatment Number:  22  Treatment Length:120 Minutes  Treatment Depth: 2.5 JULEE  HBO Treatment End Date:   HBO Discharge Outcome: Treatment Complete        HBO Treatment Details:  In-Patient Visit: no  Chamber Type:  Monoplace  Chamber #: 2  HBO Dive Lo  Treatment Protocol:  2.5 JULEE with 10 minute air break      Treatment Details:  Pressurized Rate: 2.0 psi/min  Dive Rate Down:  2.0 psi/min  Dive Rate Up:  2.0 psi/min  Started Compression: 1449  Reached Compression: 1459  Patient on Air: 1544  Patient Taken off Air:1554  Total Compression Time: 10 (Minutes)  Total Holding Time: 100 (Minutes)  Started Decompression: 1639  Reached Surface: 1459  Total Decompression Time: 10 (Minutes)  Total Airbreaks: 10 (Minutes)  Total Time of Treatment: 110 (Minutes)                         Vital Signs:  HBO Glucose: n/a - (Reference Range: 100-350 mg/dl)        Vitals:    25 1445 25 1655   BP: 124/78 (!) 146/92   Pulse: 68 66   Resp: 16 16   Temp: 98 °F (36.7 °C) 97.4 °F (36.3 °C)       Treatment Response:  Symptoms Noted During Treatment: None   Treatment Aborted:  no      Treatment Notes:  Patient treated in hard sided chamber.

## 2025-03-22 VITALS
HEART RATE: 70 BPM | DIASTOLIC BLOOD PRESSURE: 90 MMHG | RESPIRATION RATE: 12 BRPM | SYSTOLIC BLOOD PRESSURE: 144 MMHG | TEMPERATURE: 97 F

## 2025-03-22 NOTE — PROGRESS NOTES
HBO Treatment Details      Patient Name:    Robert Andrew  MRN:  [id number]        YOB: 1960  Today's Date:  3/22/2025  Physician/Provider: Naresh Tuttle MD  Facility: [facility]  Diagnosis:   1. Radiation cystitis    2. History of therapeutic radiation            Treatment Course Number: 21  Total Treatments Ordered:  30  Ordering Physician:   Indication: Soft tissue radionecrosis  Risk Factors: Barotrauma, Seizure  HBO Treatment Start Date:   HBO Treatment Number:  21  Treatment Length:120 Minutes  Treatment Depth: 2.5 JULEE  HBO Treatment End Date:   HBO Discharge Outcome: Treatment Complete        HBO Treatment Details:  In-Patient Visit: no  Chamber Type:  Monoplace  Chamber #: 2  HBO Dive Log:    Treatment Protocol:  2.5 JULEE with 10 minute air break      Treatment Details:  Pressurized Rate: 2.0 psi/min  Dive Rate Down:  2.0 psi/min  Dive Rate Up:  2.0 psi/min  Started Compression: 1517  Reached Compression: 1527  Patient on Air: 1612  Patient Taken off Air:1622  Total Compression Time: 10 (Minutes)  Total Holding Time: 100 (Minutes)  Started Decompression: 1707  Reached Surface: 1527  Total Decompression Time: 10 (Minutes)  Total Airbreaks: 10 (Minutes)  Total Time of Treatment: 110 (Minutes)                         Vital Signs:  HBO Glucose: n/a - (Reference Range: 100-350 mg/dl)        Vitals:    03/20/25 1512 03/20/25 1720   BP: 128/80 144/90   Pulse: 75 70   Resp: 16 12   Temp: 97.7 °F (36.5 °C) 97.3 °F (36.3 °C)       Treatment Response:  Symptoms Noted During Treatment: None   Treatment Aborted:  no      Treatment Notes:  Patient treated in hard sided chamber.

## 2025-03-24 ENCOUNTER — HBO THERAPY VISIT (OUTPATIENT)
Dept: WOUND CARE | Facility: HOSPITAL | Age: 65
End: 2025-03-24
Attending: FAMILY MEDICINE
Payer: MEDICARE

## 2025-03-24 DIAGNOSIS — N30.40 RADIATION CYSTITIS: Primary | ICD-10-CM

## 2025-03-24 DIAGNOSIS — Z92.3 HISTORY OF THERAPEUTIC RADIATION: ICD-10-CM

## 2025-03-24 NOTE — PROGRESS NOTES
Hyperbaric Daily Physician Note       Patient is here for Hyperbaric Oxygen therapy.   No complaints today.   Tolerating HBO with no side effects.     Blood Sugar:  Within normal limits.  Lab Results   Component Value Date    PGLU 111 (H) 02/17/2025       Vitals:      3/21/2025     2:45 PM 3/21/2025     4:55 PM   Vitals History   /78 146/92   Pulse 68 66   Resp 16 16   Temp 98 °F (36.7 °C) 97.4 °F (36.3 °C)        DIAGNOSIS:       ICD-10-CM    1. Radiation cystitis  N30.40       2. History of therapeutic radiation  Z92.3           Pre/Post Treatment Evaluation      Pre-Treatment Lung Evaluation: Clear to auscultation bilaterally    Left Ear Pre-Treatment Evaluation  Left Ear Clear: Yes  Left Ear Intact: Yes  Left Cerumen Removal: No  Pre Left teed stgstrstastdstest:st st1st grade    Right Ear Pre-Treatment Evaluation  Right Ear Clear: Yes  Right Ear Intact: Yes  Right Cerumen Removal: No  Pre Right teed stgstrstastdstest:st st1st grade      Post-Treatment Lung Evaluation: Clear to auscultation bilaterally    Left Ear Post-Treatment Evaluation  Left Ear Clear: Yes  Left Ear Intact: Yes  Left Cerumen Removal: No  Post Left teed stgstrstastdstest:st st1st grade    Right Ear Post-Treatment Evaluation  Right Ear Clear: Yes  Right Ear Intact: Yes  Right Cerumen Removal: No  Post Right teed stgstrstastdstest:st st1st grade      I supervised this Hyperbaric treatment and was immediately available throughout the course of the treatment.  There is a trained emergency support team and ICU available at this facility to assist with complications.      Return for HBO treatments as scheduled.      Kwabena Harvey MD  3/24/2025

## 2025-03-25 ENCOUNTER — HBO THERAPY VISIT (OUTPATIENT)
Dept: WOUND CARE | Facility: HOSPITAL | Age: 65
End: 2025-03-25
Attending: FAMILY MEDICINE
Payer: MEDICARE

## 2025-03-25 VITALS
RESPIRATION RATE: 12 BRPM | TEMPERATURE: 97 F | HEART RATE: 80 BPM | DIASTOLIC BLOOD PRESSURE: 86 MMHG | SYSTOLIC BLOOD PRESSURE: 140 MMHG

## 2025-03-25 DIAGNOSIS — R30.0 DYSURIA: ICD-10-CM

## 2025-03-25 DIAGNOSIS — Z92.3 HISTORY OF THERAPEUTIC RADIATION: ICD-10-CM

## 2025-03-25 DIAGNOSIS — N30.40 RADIATION CYSTITIS: Primary | ICD-10-CM

## 2025-03-25 DIAGNOSIS — Z85.46 HISTORY OF PROSTATE CANCER: ICD-10-CM

## 2025-03-25 PROCEDURE — 99183 HYPERBARIC OXYGEN THERAPY: CPT | Performed by: NURSE PRACTITIONER

## 2025-03-25 NOTE — PROGRESS NOTES
HBO Treatment Details      Patient Name:    Robert Andrew  MRN:  [id number]        YOB: 1960  Today's Date:  3/25/2025  Physician/Provider: BRENNON Puckett  Facility: [facility]  Diagnosis:   1. Radiation cystitis    2. History of therapeutic radiation    3. History of prostate cancer    4. Dysuria            Treatment Course Number: 23  Total Treatments Ordered:  30    Indication:  STRN  Risk Factors:  barotrauma, seizure  HBO Treatment Number:  24    HBO Discharge Outcome: good improved        HBO Treatment Details:  In-Patient Visit: no  Chamber Type:  hard sided monoplace  Chamber #: 1  HBO Dive Lo  Treatment Protocol:  2.5 arlyn with 10 minute air break      Treatment Details:  Pressurized Rate:    Dive Rate Down:  2.0 psi  Dive Rate Up:  2.0 psi  Started Compression:  1459  Reached Compression:  1509  Patient on Air:  1554  Patient Taken off Air: 1604  Started Decompression:  1749  Reached Surface:  1759  Total Time of Treatment:   220 (Minutes)                         Vital Signs:     Vitals:    25 1255 25 1721   BP: 126/82 140/86   Pulse: 82 80   Resp: 12 12   Temp: 98 °F (36.7 °C) 97.2 °F (36.2 °C)       Treatment Response:  Symptoms Noted During Treatment:     Treatment Aborted:  no

## 2025-03-25 NOTE — PROGRESS NOTES
Hyperbaric Daily Physician Note       Patient is here for Hyperbaric Oxygen therapy.     VITALS:      3/21/2025     4:55 PM 3/25/2025    12:55 PM   Vitals History   /92 126/82   Pulse 66 82   Resp 16 12   Temp 97.4 °F (36.3 °C) 98 °F (36.7 °C)       Lab Results   Component Value Date    PGLU 111 (H) 02/17/2025     Vitals:  See HBO tech note/flowsheets for vitals pre/post    DIAGNOSIS:   1. Radiation cystitis    2. History of therapeutic radiation    3. History of prostate cancer    4. Dysuria        Pre/Post Treatment Evaluation      Pre-Treatment Lung Evaluation: Clear to auscultation bilaterally    Left Ear Pre-Treatment Evaluation  Left Ear Clear: Yes  Left Ear Intact: Yes  Left Cerumen Removal: No  Pre Left teed stgstrstastdstest:st st1st grade    Right Ear Pre-Treatment Evaluation  Right Ear Clear: Yes  Right Ear Intact: Yes  Right Cerumen Removal: No  Pre Right teed stgstrstastdstest:st st1st grade      Post-Treatment Lung Evaluation: Clear to auscultation bilaterally    Left Ear Post-Treatment Evaluation  Left Ear Clear: Yes  Left Ear Intact: Yes  Left Cerumen Removal: No  Post Left teed stgstrstastdstest:st st1st grade    Right Ear Post-Treatment Evaluation  Right Ear Clear: Yes  Right Ear Intact: Yes  Right Cerumen Removal: No  Post Right teed stgstrstastdstest:st st1st grade      I supervised this Hyperbaric treatment and was immediately available throughout the course of the treatment with a hospital approved hyperbaric physician available by phone.  In addition, there is a trained emergency support team and ICU available at this facility to assist with complications.         BRENNON Puckett  3/25/2025  5:02 PM

## 2025-03-26 ENCOUNTER — HBO THERAPY VISIT (OUTPATIENT)
Dept: WOUND CARE | Facility: HOSPITAL | Age: 65
End: 2025-03-26
Attending: FAMILY MEDICINE
Payer: MEDICARE

## 2025-03-26 DIAGNOSIS — N30.40 RADIATION CYSTITIS: Primary | ICD-10-CM

## 2025-03-26 DIAGNOSIS — Z92.3 HISTORY OF THERAPEUTIC RADIATION: ICD-10-CM

## 2025-03-26 NOTE — PROGRESS NOTES
Hyperbaric Daily Physician Note       Patient is here for Hyperbaric Oxygen therapy.   No complaints today.   Tolerating HBO with no side effects.     Blood Sugar:  Within normal limits.  Lab Results   Component Value Date    PGLU 111 (H) 02/17/2025       Vitals:      3/25/2025    12:55 PM 3/25/2025     5:21 PM   Vitals History   /82 140/86   Pulse 82 80   Resp 12 12   Temp 98 °F (36.7 °C) 97.2 °F (36.2 °C)        DIAGNOSIS:       ICD-10-CM    1. Radiation cystitis  N30.40       2. History of therapeutic radiation  Z92.3           Pre/Post Treatment Evaluation      Pre-Treatment Lung Evaluation: Clear to auscultation bilaterally    Left Ear Pre-Treatment Evaluation  Left Ear Clear: Yes  Left Ear Intact: Yes  Left Cerumen Removal: No  Pre Left teed stgstrstastdstest:st st1st grade    Right Ear Pre-Treatment Evaluation  Right Ear Clear: Yes  Right Ear Intact: Yes  Right Cerumen Removal: No  Pre Right teed stgstrstastdstest:st st1st grade      Post-Treatment Lung Evaluation: Clear to auscultation bilaterally    Left Ear Post-Treatment Evaluation  Left Ear Clear: Yes  Left Ear Intact: Yes  Left Cerumen Removal: No  Post Left teed stgstrstastdstest:st st1st grade    Right Ear Post-Treatment Evaluation  Right Ear Clear: Yes  Right Ear Intact: Yes  Right Cerumen Removal: No  Post Right teed stgstrstastdstest:st st1st grade      I supervised this Hyperbaric treatment and was immediately available throughout the course of the treatment.  There is a trained emergency support team and ICU available at this facility to assist with complications.      Return for HBO treatments as scheduled.      Kwabena Harvey MD  3/26/2025  4:50 PM

## 2025-03-27 ENCOUNTER — APPOINTMENT (OUTPATIENT)
Dept: WOUND CARE | Facility: HOSPITAL | Age: 65
End: 2025-03-27
Attending: FAMILY MEDICINE
Payer: MEDICARE

## 2025-03-27 VITALS
RESPIRATION RATE: 14 BRPM | DIASTOLIC BLOOD PRESSURE: 80 MMHG | TEMPERATURE: 97 F | HEART RATE: 66 BPM | SYSTOLIC BLOOD PRESSURE: 141 MMHG

## 2025-03-27 NOTE — PROGRESS NOTES
HBO Treatment Details      Patient Name:    Robert Andrew  MRN:  [id number]        YOB: 1960  Today's Date:  3/27/2025  Physician/Provider: Kwabena Harvey MD  Facility: [facility]  Diagnosis:   1. Radiation cystitis    2. History of therapeutic radiation            Treatment Course Number: 25  Total Treatments Ordered:  30  Ordering Physician:   Indication: Soft tissue radionecrosis  Risk Factors: Seizure, Barotrauma  HBO Treatment Start Date:   HBO Treatment Number:  25  Treatment Length:120 Minutes  Treatment Depth: 2.5 JULEE  HBO Treatment End Date:   HBO Discharge Outcome: Treatment Complete        HBO Treatment Details:  In-Patient Visit: no  Chamber Type:  Hard sided monoplace  Chamber #: 1  HBO Dive Log:    Treatment Protocol:  2.5 JULEE with 10 min air break      Treatment Details:  Pressurized Rate: 2.0 psi/min  Dive Rate Down:  2.0 psi/min  Dive Rate Up:  2.0 psi/min  Started Compression: 1506  Reached Compression: 1516  Patient on Air: 1601  Patient Taken off Air:1611  Total Compression Time: 10 (Minutes)  Total Holding Time: 100 (Minutes)  Started Decompression: 1656  Reached Surface: 1516  Total Decompression Time: 10 (Minutes)  Total Airbreaks: 10 (Minutes)  Total Time of Treatment: 110 (Minutes)                         Vital Signs:  HBO Glucose:  - (Reference Range: 100-350 mg/dl)        Vitals:    03/26/25 0509 03/26/25 1501   BP: 136/82 141/80   Pulse: 72 66   Resp: 12 14   Temp: 97.2 °F (36.2 °C) 97.2 °F (36.2 °C)       Treatment Response:  Symptoms Noted During Treatment: None   Treatment Aborted:  No      Treatment Notes:

## 2025-03-28 ENCOUNTER — APPOINTMENT (OUTPATIENT)
Dept: WOUND CARE | Facility: HOSPITAL | Age: 65
End: 2025-03-28
Attending: FAMILY MEDICINE
Payer: MEDICARE

## 2025-03-28 VITALS
HEART RATE: 69 BPM | RESPIRATION RATE: 16 BRPM | TEMPERATURE: 97 F | DIASTOLIC BLOOD PRESSURE: 88 MMHG | SYSTOLIC BLOOD PRESSURE: 134 MMHG

## 2025-03-28 DIAGNOSIS — Z85.46 HISTORY OF PROSTATE CANCER: ICD-10-CM

## 2025-03-28 DIAGNOSIS — N30.40 RADIATION CYSTITIS: Primary | ICD-10-CM

## 2025-03-28 NOTE — PROGRESS NOTES
Hyperbaric Daily Physician Note       Patient is here for Hyperbaric Oxygen therapy.   No complaints today.   Tolerating HBO with no side effects.     Blood Sugar:  Within normal limits.  Lab Results   Component Value Date    PGLU 111 (H) 02/17/2025       Vitals:      3/26/2025     3:01 PM 3/28/2025     3:00 PM   Vitals History   /80 126/82   Pulse 66 76   Resp 14 16   Temp 97.2 °F (36.2 °C) 97.6 °F (36.4 °C)        DIAGNOSIS:       ICD-10-CM    1. Radiation cystitis  N30.40       2. History of prostate cancer  Z85.46           Pre/Post Treatment Evaluation      Pre-Treatment Lung Evaluation: Clear to auscultation bilaterally    Left Ear Pre-Treatment Evaluation  Left Ear Clear: Yes  Left Ear Intact: Yes  Left Cerumen Removal: No  Pre Left teed stgstrstastdstest:st st1st grade    Right Ear Pre-Treatment Evaluation  Right Ear Clear: Yes  Right Ear Intact: Yes  Right Cerumen Removal: No  Pre Right teed stgstrstastdstest:st st1st grade      Post-Treatment Lung Evaluation: Clear to auscultation bilaterally    Left Ear Post-Treatment Evaluation  Left Ear Clear: Yes  Left Ear Intact: Yes  Left Cerumen Removal: No  Post Left teed stgstrstastdstest:st st1st grade    Right Ear Post-Treatment Evaluation  Right Ear Clear: Yes  Right Ear Intact: Yes  Right Cerumen Removal: No  Post Right teed stgstrstastdstest:st st1st grade      I supervised this Hyperbaric treatment and was immediately available throughout the course of the treatment.  There is a trained emergency support team and ICU available at this facility to assist with complications.      Return for HBO treatments as scheduled.      Kwabena Harvey MD  3/28/2025  5:01 PM

## 2025-03-28 NOTE — PROGRESS NOTES
HBO Treatment Details      Patient Name:    Robert Andrew  MRN:  [id number]        YOB: 1960  Today's Date:  3/28/2025  Physician/Provider: Kwabena Harvey MD  Facility: [facility]  Diagnosis:   1. Radiation cystitis    2. History of prostate cancer            Treatment Course Number: 26  Total Treatments Ordered:  30  Ordering Physician:   Indication: Soft tissue radionecrosis  Risk Factors: Barotrauma, Seizure  HBO Treatment Start Date:   HBO Treatment Number:  26  Treatment Length:120 Minutes  Treatment Depth: 2.5 JULEE  HBO Treatment End Date:   HBO Discharge Outcome: Treatment Complete        HBO Treatment Details:  In-Patient Visit: no  Chamber Type:  Monoplace  Chamber #: 2  HBO Dive Lo  Treatment Protocol:  2.5 JULEE with 10 minute air break      Treatment Details:  Pressurized Rate: 2.0 psi/min  Dive Rate Down:  2.0 psi/min  Dive Rate Up:  2.0 psi/min  Started Compression: 1507  Reached Compression: 1517  Patient on Air: 1602  Patient Taken off Air:1612  Total Compression Time: 10 (Minutes)  Total Holding Time: 100 (Minutes)  Started Decompression: 1657  Reached Surface: 1517  Total Decompression Time: 10 (Minutes)  Total Airbreaks: 10 (Minutes)  Total Time of Treatment: 110 (Minutes)                         Vital Signs:  HBO Glucose: n/a - (Reference Range: 100-350 mg/dl)        Vitals:    25 1500 25 1710   BP: 126/82 134/88   Pulse: 76 69   Resp: 16 16   Temp: 97.6 °F (36.4 °C) 97.3 °F (36.3 °C)       Treatment Response:  Symptoms Noted During Treatment: None   Treatment Aborted:  no      Treatment Notes:  Patient treated in hard sided chamber.

## 2025-03-31 ENCOUNTER — APPOINTMENT (OUTPATIENT)
Dept: WOUND CARE | Facility: HOSPITAL | Age: 65
End: 2025-03-31
Attending: FAMILY MEDICINE
Payer: MEDICARE

## 2025-04-01 ENCOUNTER — APPOINTMENT (OUTPATIENT)
Dept: WOUND CARE | Facility: HOSPITAL | Age: 65
End: 2025-04-01
Attending: FAMILY MEDICINE
Payer: MEDICARE

## 2025-04-02 ENCOUNTER — APPOINTMENT (OUTPATIENT)
Dept: WOUND CARE | Facility: HOSPITAL | Age: 65
End: 2025-04-02
Attending: FAMILY MEDICINE
Payer: MEDICARE

## 2025-04-03 ENCOUNTER — APPOINTMENT (OUTPATIENT)
Dept: WOUND CARE | Facility: HOSPITAL | Age: 65
End: 2025-04-03
Attending: FAMILY MEDICINE
Payer: MEDICARE